# Patient Record
(demographics unavailable — no encounter records)

---

## 2024-10-09 NOTE — PHYSICAL EXAM
[General Appearance - Well Developed] : well developed [General Appearance - Well Nourished] : well nourished [Normal Appearance] : normal appearance [Well Groomed] : well groomed [General Appearance - In No Acute Distress] : no acute distress [Edema] : no peripheral edema [Exaggerated Use Of Accessory Muscles For Inspiration] : no accessory muscle use [Respiration, Rhythm And Depth] : normal respiratory rhythm and effort [Abdomen Soft] : soft [Abdomen Tenderness] : non-tender [Costovertebral Angle Tenderness] : no ~M costovertebral angle tenderness [Urethral Meatus] : normal urethra [Urinary Bladder Findings] : the bladder was normal on palpation [External Female Genitalia] : normal external genitalia [Normal Station and Gait] : the gait and station were normal for the patient's age [Skin Color & Pigmentation] : normal skin color and pigmentation [Skin Turgor] : supple [] : no rash [No Focal Deficits] : no focal deficits [Oriented To Time, Place, And Person] : oriented to person, place, and time [Affect] : the affect was normal [Mood] : the mood was normal [Not Anxious] : not anxious [Chaperone Present] : A chaperone was present in the examining room during all aspects of the physical examination [43769] : A chaperone was present during the pelvic exam. [FreeTextEntry2] : JEFF Angelo MA

## 2024-10-09 NOTE — ASSESSMENT
[FreeTextEntry1] : 74F with longstanding bilateral hydronephrosis, recently noted worsening of left side. Patient has a 1.3 cm stone noted on the right side. Patient is completely asymptomatic. Normal SCr. Patient also with significant microscopic hematuria and episode of gross hematuria in the past.  Negative hematuria workup completed 10/9/2024.  Patient was noted to have significant cystocele on exam with medialization of her UOs.  Renal scan shows no interval change in the last 10 years though persistent obstruction noted.  -Ultrasound and MRI images reviewed from present to 2017 -CT urogram reviewed -patient understands she needs to follow-up with her PCP regarding the lesion in the lung that was noted -Renal lasex scan reviewed  -UA/Ucx reviewed -Office cystoscopy neg for malignancy 10/9/24 - signifcant prolapse noted w/ medialization of UOs -Referral to Dr. Jurado for UDS and pessary versus prolapse repair   I had an extensive discussion with the patient regarding hematuria. We discussed the 2020 AUA Microscopic Hematuria Guidelines, which stratify patients into low-, intermediate-, and high-risk based on factors such as # of RBCs on microscopic analysis, age, gender, smoking history, and additional urothelial cancer risk factors. For low-risk patients, repeat UA in 6 months vs renal US + cysto are recommended. For intermediate-risk patients, renal US + cysto are recommended. For high-risk patients, CT Urogram + cysto are recommended. We discussed evaluation for non-malignant or gynecologic sources of the microscopic hematuria which includes, but is not limited to, kidney/bladder stones, UTIs, vaginal atrophy, and medical renal disease. Per guidelines, patients w/ microscopic hematuria who are on antiplatelet agents or anticoagulant medication should still be evaluated in the same manner as those who are not on such medications. Risk and benefits of evaluation were discussed. All of the patient's questions and concerns were addressed. Based on the 2020 AUA guidelines, this patient falls under HIGH risk.   Rose Kwok MD Chief of Urology, [address], [facility] Entr[address] [place] [address] Phone: [phone] Fax: [phone]

## 2024-10-09 NOTE — ASSESSMENT
[FreeTextEntry1] : 74F with longstanding bilateral hydronephrosis, recently noted worsening of left side. Patient has a 1.3 cm stone noted on the right side. Patient is completely asymptomatic. Normal SCr. Patient also with significant microscopic hematuria and episode of gross hematuria in the past.  Negative hematuria workup completed 10/9/2024.  Patient was noted to have significant cystocele on exam with medialization of her UOs.  Renal scan shows no interval change in the last 10 years though persistent obstruction noted.  -Ultrasound and MRI images reviewed from present to 2017 -CT urogram reviewed -patient understands she needs to follow-up with her PCP regarding the lesion in the lung that was noted -Renal lasex scan reviewed  -UA/Ucx reviewed -Office cystoscopy neg for malignancy 10/9/24 - signifcant prolapse noted w/ medialization of UOs -Referral to Dr. Jurado for UDS and pessary versus prolapse repair   I had an extensive discussion with the patient regarding hematuria. We discussed the 2020 AUA Microscopic Hematuria Guidelines, which stratify patients into low-, intermediate-, and high-risk based on factors such as # of RBCs on microscopic analysis, age, gender, smoking history, and additional urothelial cancer risk factors. For low-risk patients, repeat UA in 6 months vs renal US + cysto are recommended. For intermediate-risk patients, renal US + cysto are recommended. For high-risk patients, CT Urogram + cysto are recommended. We discussed evaluation for non-malignant or gynecologic sources of the microscopic hematuria which includes, but is not limited to, kidney/bladder stones, UTIs, vaginal atrophy, and medical renal disease. Per guidelines, patients w/ microscopic hematuria who are on antiplatelet agents or anticoagulant medication should still be evaluated in the same manner as those who are not on such medications. Risk and benefits of evaluation were discussed. All of the patient's questions and concerns were addressed. Based on the 2020 AUA guidelines, this patient falls under HIGH risk.   Rose Kwok MD Chief of Urology, [address], [facility] [address]tr[address] [place] [address] Phone: [phone] Fax: [phone]

## 2024-10-09 NOTE — PHYSICAL EXAM
[General Appearance - Well Developed] : well developed [General Appearance - Well Nourished] : well nourished [Normal Appearance] : normal appearance [Well Groomed] : well groomed [General Appearance - In No Acute Distress] : no acute distress [Edema] : no peripheral edema [Exaggerated Use Of Accessory Muscles For Inspiration] : no accessory muscle use [Respiration, Rhythm And Depth] : normal respiratory rhythm and effort [Abdomen Soft] : soft [Abdomen Tenderness] : non-tender [Costovertebral Angle Tenderness] : no ~M costovertebral angle tenderness [Urethral Meatus] : normal urethra [Urinary Bladder Findings] : the bladder was normal on palpation [External Female Genitalia] : normal external genitalia [Normal Station and Gait] : the gait and station were normal for the patient's age [Skin Color & Pigmentation] : normal skin color and pigmentation [Skin Turgor] : supple [] : no rash [No Focal Deficits] : no focal deficits [Oriented To Time, Place, And Person] : oriented to person, place, and time [Mood] : the mood was normal [Affect] : the affect was normal [Not Anxious] : not anxious [Chaperone Present] : A chaperone was present in the examining room during all aspects of the physical examination [67263] : A chaperone was present during the pelvic exam. [FreeTextEntry2] : JEFF Angelo MA

## 2024-10-09 NOTE — HISTORY OF PRESENT ILLNESS
[FreeTextEntry1] : Referring Provider: Dr. Park Esquivel Chief Complaint: Bilateral hydronephrosis and nephrolithiasis Date of first visit: 09/09/2024  DINAH SAHU is a 74 year old French woman with a PMHx of HTN, HLD, DM2 who presents for evaluation of bilateral hydronephrosis. Patient states that she is only done this fairly recently, but review of her imaging in PACS show that she has had bilateral hydronephrosis since as early as 2017. At that point in time was greater on the right side than the left, though most recent imaging showed that it is no greater on the left than right. Patient has been completely asymptomatic. Has no history of passing a stone though 1.3 cm stone has been seen on the right side this increase in size since previously noted. Patient reports an episode of gross hematuria in 2012 that she believes might have been associated with an infection but is unclear. She also has been seen to have significant amounts of microscopic hematuria but denies any formal hematuria workup. Patient is completely asymptomatic and denies any history of flank pain. Denies recurrent UTIs.  Negative hematuria workup as of 10/9/2024.  On examination, patient noted to have cystocele that is causing medialization of ureteral orifice.  This may be the cause of her bilateral hydronephrosis due to ureteral kinking.  Renal scan reviewed.  Evidence of obstruction though stable.  Renal Lasix Scan (10/02/24):  -Following diuretic challenge, the T-1/2 washout from the left collecting system is 32 minutes; the T1/2 washout from the right collecting system is 16 minutes (normal:10 minutes or less). -Differential renal function: Right kidney: 48% (previously: 46%) ; Left kidney: 52% (previously: 54%). -Normal flow to and function of the LEFT kidney with evidence of obstruction. Allowing for differences in technique there has been no significant interval change in function or in response to diuretic challenge compared to the previous study of 12/28/2011. -Normal flow to and function of the RIGHT kidney which demonstrates an equivocal response to diuretic challenge. Partial or low-grade obstruction cannot be excluded. Allowing for differences in technique there has been no significant interval change in function or in response to diuretic challenge compared to the previous study of 12/28/2011.  US Hx: - renal/bladder (8/16/2024): Right kidney: 12.2 cm. A 1.3 cm obstructing calculus is seen in the renal pelvis producing moderate right hydronephrosis. This is more pronounced when compared to prior study. Left kidney: 11.6 cm. Severe left hydronephrosis is appreciated. No discrete renal calculus is seen. Distal obstruction cannot be excluded. Urinary bladder: The bladder is moderately distended. No discrete bladder calculus is seen. No significant focal bladder wall thickening is seen. A prevoid bladder volume of 234 cc is noted. A post void bladder volume of 80 cc is noted.  - renal/fsfhkmq97/22/20): Right kidney: 11.3 cm. A 1.1 cm calculus is seen in the right renal pelvis. Mild right hydronephrosis is seen. This is without significant change from prior study. Left kidney: 11.3 cm. Mild to moderate left hydronephrosis is seen. Distal obstruction cannot be excluded. This is also without significant change from prior study. Urinary bladder: The bladder is mildly distended. No focal bladder wall thickening is seen. No discrete bladder calculus is noted. A prevoid bladder volume of 158 cc is noted. A post void bladder volume of 66 cc is noted.  -Renal/bladder (12/18/17): Right kidney: 10.5 cm. Mild to moderate right hydronephrosis is seen. 6 mm calculus is seen in the right renal pelvis. Left kidney: 10.4 cm. Moderate left hydronephrosis is seen. No discrete renal calculus is seen. Distal obstruction cannot be excluded. Urinary bladder: The bladder was empty limiting evaluation.  MRI Hx: -abd (3/17/2021): Stable bilateral hydronephrosis, mild to moderate on the right and moderate on the left. There is a stable small nonobstructive calculus in the dependent right renal pelvis.  CT Hx: - a/p oral contrast (10/12/17): There is bilateral hydronephrosis, moderate on the right and moderate to severe on the left. The degree of hydronephrosis appears increased from prior evaluation and the configuration of hydronephrosis suggests underlying UPJ obstructions. Correlate clinically. There is a 3 mm calculus identified within the right renal pelvis. No left-sided renal calculi are noted. However, there is asymmetric infiltration of the left perirenal fat and thickening of adjacent intraperitoneal fascial planes. 2 small calcifications in each measuring less than 3 mm are identified layering posteriorly within the distended urinary bladder, likely related to passed calculi; left-sided pyelonephritis cannot be excluded.  PMHx: HTN, HLD, GERD, DM 2 SxHx: Hysterectomy (2003), breast surgery FHx: Ovarian cancer (mother), CAD (brother) SocHx: Current smoker (half pack per day x 50 years) Allergies: NKDA, Lipitor gave her myalgias  The patient denies fevers, chills, nausea and or vomiting and no unexplained weight loss. All pertinent laboratory, films and physician notes were reviewed.

## 2024-10-09 NOTE — HISTORY OF PRESENT ILLNESS
[FreeTextEntry1] : Referring Provider: Dr. Park Esquivel Chief Complaint: Bilateral hydronephrosis and nephrolithiasis Date of first visit: 09/09/2024  DINAH SAHU is a 74 year old English woman with a PMHx of HTN, HLD, DM2 who presents for evaluation of bilateral hydronephrosis. Patient states that she is only done this fairly recently, but review of her imaging in PACS show that she has had bilateral hydronephrosis since as early as 2017. At that point in time was greater on the right side than the left, though most recent imaging showed that it is no greater on the left than right. Patient has been completely asymptomatic. Has no history of passing a stone though 1.3 cm stone has been seen on the right side this increase in size since previously noted. Patient reports an episode of gross hematuria in 2012 that she believes might have been associated with an infection but is unclear. She also has been seen to have significant amounts of microscopic hematuria but denies any formal hematuria workup. Patient is completely asymptomatic and denies any history of flank pain. Denies recurrent UTIs.  Negative hematuria workup as of 10/9/2024.  On examination, patient noted to have cystocele that is causing medialization of ureteral orifice.  This may be the cause of her bilateral hydronephrosis due to ureteral kinking.  Renal scan reviewed.  Evidence of obstruction though stable.  Renal Lasix Scan (10/02/24):  -Following diuretic challenge, the T-1/2 washout from the left collecting system is 32 minutes; the T1/2 washout from the right collecting system is 16 minutes (normal:10 minutes or less). -Differential renal function: Right kidney: 48% (previously: 46%) ; Left kidney: 52% (previously: 54%). -Normal flow to and function of the LEFT kidney with evidence of obstruction. Allowing for differences in technique there has been no significant interval change in function or in response to diuretic challenge compared to the previous study of 12/28/2011. -Normal flow to and function of the RIGHT kidney which demonstrates an equivocal response to diuretic challenge. Partial or low-grade obstruction cannot be excluded. Allowing for differences in technique there has been no significant interval change in function or in response to diuretic challenge compared to the previous study of 12/28/2011.  US Hx: - renal/bladder (8/16/2024): Right kidney: 12.2 cm. A 1.3 cm obstructing calculus is seen in the renal pelvis producing moderate right hydronephrosis. This is more pronounced when compared to prior study. Left kidney: 11.6 cm. Severe left hydronephrosis is appreciated. No discrete renal calculus is seen. Distal obstruction cannot be excluded. Urinary bladder: The bladder is moderately distended. No discrete bladder calculus is seen. No significant focal bladder wall thickening is seen. A prevoid bladder volume of 234 cc is noted. A post void bladder volume of 80 cc is noted.  - renal/nsxzwsf47/22/20): Right kidney: 11.3 cm. A 1.1 cm calculus is seen in the right renal pelvis. Mild right hydronephrosis is seen. This is without significant change from prior study. Left kidney: 11.3 cm. Mild to moderate left hydronephrosis is seen. Distal obstruction cannot be excluded. This is also without significant change from prior study. Urinary bladder: The bladder is mildly distended. No focal bladder wall thickening is seen. No discrete bladder calculus is noted. A prevoid bladder volume of 158 cc is noted. A post void bladder volume of 66 cc is noted.  -Renal/bladder (12/18/17): Right kidney: 10.5 cm. Mild to moderate right hydronephrosis is seen. 6 mm calculus is seen in the right renal pelvis. Left kidney: 10.4 cm. Moderate left hydronephrosis is seen. No discrete renal calculus is seen. Distal obstruction cannot be excluded. Urinary bladder: The bladder was empty limiting evaluation.  MRI Hx: -abd (3/17/2021): Stable bilateral hydronephrosis, mild to moderate on the right and moderate on the left. There is a stable small nonobstructive calculus in the dependent right renal pelvis.  CT Hx: - a/p oral contrast (10/12/17): There is bilateral hydronephrosis, moderate on the right and moderate to severe on the left. The degree of hydronephrosis appears increased from prior evaluation and the configuration of hydronephrosis suggests underlying UPJ obstructions. Correlate clinically. There is a 3 mm calculus identified within the right renal pelvis. No left-sided renal calculi are noted. However, there is asymmetric infiltration of the left perirenal fat and thickening of adjacent intraperitoneal fascial planes. 2 small calcifications in each measuring less than 3 mm are identified layering posteriorly within the distended urinary bladder, likely related to passed calculi; left-sided pyelonephritis cannot be excluded.  PMHx: HTN, HLD, GERD, DM 2 SxHx: Hysterectomy (2003), breast surgery FHx: Ovarian cancer (mother), CAD (brother) SocHx: Current smoker (half pack per day x 50 years) Allergies: NKDA, Lipitor gave her myalgias  The patient denies fevers, chills, nausea and or vomiting and no unexplained weight loss. All pertinent laboratory, films and physician notes were reviewed.

## 2024-10-23 NOTE — PHYSICAL EXAM
[No JVD] : no jugular venous distention [Normal] : normal rate, regular rhythm, normal S1 and S2 and no murmur heard [Soft] : abdomen soft [Non Tender] : non-tender [Non-distended] : non-distended [No Joint Swelling] : no joint swelling [Grossly Normal Strength/Tone] : grossly normal strength/tone [de-identified] : pain LS Spine L4-5 area with pain piriformis muscle area 4/5 from RLE 5/5 left good ROM when stretching

## 2024-10-23 NOTE — HEALTH RISK ASSESSMENT
[Intercurrent ED visits] : went to ED [No falls in past year] : Patient reported no falls in the past year [de-identified] : Hematuria back pain hydropnephrosis

## 2024-10-23 NOTE — ASSESSMENT
[FreeTextEntry1] : d/w pt ROM stretching exercises  sent Mobic prn advised warm compresses and sent PT and Xray f/u 6 weeks   I am providing continuous care that includes testing, discussion of treatment options and shared decision making on diagnosis chosen treatment.

## 2024-10-23 NOTE — HISTORY OF PRESENT ILLNESS
[FreeTextEntry1] : ED visit f/u Right hydronephrosis heme [de-identified] : 74year-old-female with history of T2DM, HTN, HLD and hx of kidney stone ended up in ED with c/o 2-day history of hematuria and right lower abdominal pain and right flank pain, with associated nausea. Denies fever or vomiting at home.  Workup includes labs, UA/UC and CTAP. IV hydration and pain control pt had already been seen urology and was advised to follow up with Dr. Kwok Pt now here notes is having a lot back pain when she moves and also low back pain states for one year was good, and states was vacuuming stairs that caused it to get worse. Pt denies bowel or bladder issues and notes is still smoking.  Pt note saw Dr Sears and for one year did not get pain

## 2024-10-23 NOTE — REVIEW OF SYSTEMS
[Fatigue] : fatigue [Muscle Pain] : muscle pain [Back Pain] : back pain [FreeTextEntry9] : LS back pain worse right side radiating to RLE

## 2024-10-28 NOTE — HISTORY OF PRESENT ILLNESS
[Currently Experiencing ___] :  [unfilled] [Hematuria - Gross] : gross hematuria [6] : 6 [Left Flank] : left flank [FreeTextEntry1] : Referring Provider: Dr. Park Esquivel Chief Complaint: Bilateral hydronephrosis and nephrolithiasis Date of first visit: 09/09/2024  DINAH SAHU is a 74 year old [place] woman with a PMHx of HTN, HLD, DM2 who presents for evaluation of bilateral hydronephrosis. Patient states that she is only done this fairly recently, but review of her imaging in PACS show that she has had bilateral hydronephrosis since as early as 2017. At that point in time was greater on the right side than the left, though most recent imaging showed that it is no greater on the left than right. Patient has been completely asymptomatic. Has no history of passing a stone though 1.3 cm stone has been seen on the right side this increase in size since previously noted. Patient reports an episode of gross hematuria in 2012 that she believes might have been associated with an infection but is unclear. She also has been seen to have significant amounts of microscopic hematuria but denies any formal hematuria workup. Patient is completely asymptomatic and denies any history of flank pain. Denies recurrent UTIs.  Negative hematuria workup as of 10/9/2024. On examination, patient noted to have cystocele that is causing medialization of ureteral orifice. This may be the cause of her bilateral hydronephrosis due to ureteral kinking. Renal scan reviewed. Evidence of obstruction though stable.  As of 10/28/24, patient presents today s/p ER evaluation on 10/14/24 for right renal colic. CT showed right hemorrhage within collecting system, possible due to intrarenal stone. Hx of bilateral hydronephrosis and cystocele, underwent in office cysto on 10/9/24. Discharged home with close follow up. UA/Ucx ordered on 10/24/24 after patient called office for complaints of UTI symptoms with no reported gross hematuria. +Ucx positive, abx sent. Today, she presents to the office pale and ill-appearing with reports of gross hematuria, currently describing urine as a burgundy color that began 10/26/24, accompanied by dizziness, weakness and left flank pain radiating to LLQ. She reports one episode of vomiting on Saturday. Denies fever, although has not checked her temperature. She denies chest pain or sob.   Ucx Hx 10/25/24: Prelim <100,000 Gram Negative Rods   Renal Lasix Scan (10/02/24): -Following diuretic challenge, the T-1/2 washout from the left collecting system is 32 minutes; the T1/2 washout from the right collecting system is 16 minutes (normal:10 minutes or less). -Differential renal function: Right kidney: 48% (previously: 46%) ; Left kidney: 52% (previously: 54%). -Normal flow to and function of the LEFT kidney with evidence of obstruction. Allowing for differences in technique there has been no significant interval change in function or in response to diuretic challenge compared to the previous study of 12/28/2011. -Normal flow to and function of the RIGHT kidney which demonstrates an equivocal response to diuretic challenge. Partial or low-grade obstruction cannot be excluded. Allowing for differences in technique there has been no significant interval change in function or in response to diuretic challenge compared to the previous study of 12/28/2011.  US Hx: - renal/bladder (8/16/2024): Right kidney: 12.2 cm. A 1.3 cm obstructing calculus is seen in the renal pelvis producing moderate right hydronephrosis. This is more pronounced when compared to prior study. Left kidney: 11.6 cm. Severe left hydronephrosis is appreciated. No discrete renal calculus is seen. Distal obstruction cannot be excluded. Urinary bladder: The bladder is moderately distended. No discrete bladder calculus is seen. No significant focal bladder wall thickening is seen. A prevoid bladder volume of 234 cc is noted. A post void bladder volume of 80 cc is noted.  - renal/ldcylcy06/22/20): Right kidney: 11.3 cm. A 1.1 cm calculus is seen in the right renal pelvis. Mild right hydronephrosis is seen. This is without significant change from prior study. Left kidney: 11.3 cm. Mild to moderate left hydronephrosis is seen. Distal obstruction cannot be excluded. This is also without significant change from prior study. Urinary bladder: The bladder is mildly distended. No focal bladder wall thickening is seen. No discrete bladder calculus is noted. A prevoid bladder volume of 158 cc is noted. A post void bladder volume of 66 cc is noted.  -Renal/bladder (12/18/17): Right kidney: 10.5 cm. Mild to moderate right hydronephrosis is seen. 6 mm calculus is seen in the right renal pelvis. Left kidney: 10.4 cm. Moderate left hydronephrosis is seen. No discrete renal calculus is seen. Distal obstruction cannot be excluded. Urinary bladder: The bladder was empty limiting evaluation.  MRI Hx: -abd (3/17/2021): Stable bilateral hydronephrosis, mild to moderate on the right and moderate on the left. There is a stable small nonobstructive calculus in the dependent right renal pelvis.  CT Hx: -a/p contrast (10/14/24): KIDNEYS/URETERS: Moderately severe right and moderate left-sided dilatation of the renal pelvis and fullness of the calyces, essentially unchanged. There has been interval development of hyperdense material within the right renal infundibula and renal pelvis suggestive of hemorrhagic content. There is an 8 mm sized nonobstructing calculus within the dilated right renal pelvis and a 3 mm sized nonobstructing calculus within the right lower pole renal calyx. Mural enhancement of the right renal pelvis and proximal right ureter is noted suggesting pyelitis and ureteritis. BLADDER: Under distended. Although mural thickening may be related to underdistention, there is perivesicular haziness concerning for cystitis. REPRODUCTIVE ORGANS: Hysterectomy. BOWEL: No bowel obstruction. Appendix is normal. Uncomplicated sigmoid diverticula. Rectocele. PERITONEUM/RETROPERITONEUM: Right paracolic haziness and stranding. Cannot exclude an infiltrative process involving the peritoneum. - a/p oral contrast (10/12/17): There is bilateral hydronephrosis, moderate on the right and moderate to severe on the left. The degree of hydronephrosis appears increased from prior evaluation and the configuration of hydronephrosis suggests underlying UPJ obstructions. Correlate clinically. There is a 3 mm calculus identified within the right renal pelvis. No left-sided renal calculi are noted. However, there is asymmetric infiltration of the left perirenal fat and thickening of adjacent intraperitoneal fascial planes. 2 small calcifications in each measuring less than 3 mm are identified layering posteriorly within the distended urinary bladder, likely related to passed calculi; left-sided pyelonephritis cannot be excluded.  PMHx: HTN, HLD, GERD, DM 2 SxHx: Hysterectomy (2003), breast surgery FHx: Ovarian cancer (mother), CAD (brother) SocHx: Current smoker (half pack per day x 50 years) Allergies: NKDA, Lipitor gave her myalgias  The patient denies fevers, chills, nausea and or vomiting and no unexplained weight loss. All pertinent laboratory, films and physician notes were reviewed.

## 2024-10-28 NOTE — PHYSICAL EXAM
[Normal Appearance] : normal appearance [Well Groomed] : well groomed [General Appearance - In No Acute Distress] : no acute distress [Edema] : no peripheral edema [] : no respiratory distress [Respiration, Rhythm And Depth] : normal respiratory rhythm and effort [Exaggerated Use Of Accessory Muscles For Inspiration] : no accessory muscle use [Abdomen Soft] : soft [Urinary Bladder Findings] : the bladder was normal on palpation [Normal Station and Gait] : the gait and station were normal for the patient's age [No Focal Deficits] : no focal deficits [Oriented To Time, Place, And Person] : oriented to person, place, and time [Affect] : the affect was normal [Mood] : the mood was normal [de-identified] : +Left CVA tenderness, +LLQ on palpation [de-identified] : general apperance pale, pale conjunctiva noted

## 2024-10-28 NOTE — HISTORY OF PRESENT ILLNESS
[Currently Experiencing ___] :  [unfilled] [Hematuria - Gross] : gross hematuria [6] : 6 [Left Flank] : left flank [FreeTextEntry1] : Referring Provider: Dr. Park Esquivel Chief Complaint: Bilateral hydronephrosis and nephrolithiasis Date of first visit: 09/09/2024  DINAH SAHU is a 74 year old [place] woman with a PMHx of HTN, HLD, DM2 who presents for evaluation of bilateral hydronephrosis. Patient states that she is only done this fairly recently, but review of her imaging in PACS show that she has had bilateral hydronephrosis since as early as 2017. At that point in time was greater on the right side than the left, though most recent imaging showed that it is no greater on the left than right. Patient has been completely asymptomatic. Has no history of passing a stone though 1.3 cm stone has been seen on the right side this increase in size since previously noted. Patient reports an episode of gross hematuria in 2012 that she believes might have been associated with an infection but is unclear. She also has been seen to have significant amounts of microscopic hematuria but denies any formal hematuria workup. Patient is completely asymptomatic and denies any history of flank pain. Denies recurrent UTIs.  Negative hematuria workup as of 10/9/2024. On examination, patient noted to have cystocele that is causing medialization of ureteral orifice. This may be the cause of her bilateral hydronephrosis due to ureteral kinking. Renal scan reviewed. Evidence of obstruction though stable.  As of 10/28/24, patient presents today s/p ER evaluation on 10/14/24 for right renal colic. CT showed right hemorrhage within collecting system, possible due to intrarenal stone. Hx of bilateral hydronephrosis and cystocele, underwent in office cysto on 10/9/24. Discharged home with close follow up. UA/Ucx ordered on 10/24/24 after patient called office for complaints of UTI symptoms with no reported gross hematuria. +Ucx positive, abx sent. Today, she presents to the office pale and ill-appearing with reports of gross hematuria, currently describing urine as a burgundy color that began 10/26/24, accompanied by dizziness, weakness and left flank pain radiating to LLQ. She reports one episode of vomiting on Saturday. Denies fever, although has not checked her temperature. She denies chest pain or sob.   Ucx Hx 10/25/24: Prelim <100,000 Gram Negative Rods   Renal Lasix Scan (10/02/24): -Following diuretic challenge, the T-1/2 washout from the left collecting system is 32 minutes; the T1/2 washout from the right collecting system is 16 minutes (normal:10 minutes or less). -Differential renal function: Right kidney: 48% (previously: 46%) ; Left kidney: 52% (previously: 54%). -Normal flow to and function of the LEFT kidney with evidence of obstruction. Allowing for differences in technique there has been no significant interval change in function or in response to diuretic challenge compared to the previous study of 12/28/2011. -Normal flow to and function of the RIGHT kidney which demonstrates an equivocal response to diuretic challenge. Partial or low-grade obstruction cannot be excluded. Allowing for differences in technique there has been no significant interval change in function or in response to diuretic challenge compared to the previous study of 12/28/2011.  US Hx: - renal/bladder (8/16/2024): Right kidney: 12.2 cm. A 1.3 cm obstructing calculus is seen in the renal pelvis producing moderate right hydronephrosis. This is more pronounced when compared to prior study. Left kidney: 11.6 cm. Severe left hydronephrosis is appreciated. No discrete renal calculus is seen. Distal obstruction cannot be excluded. Urinary bladder: The bladder is moderately distended. No discrete bladder calculus is seen. No significant focal bladder wall thickening is seen. A prevoid bladder volume of 234 cc is noted. A post void bladder volume of 80 cc is noted.  - renal/nommsix19/22/20): Right kidney: 11.3 cm. A 1.1 cm calculus is seen in the right renal pelvis. Mild right hydronephrosis is seen. This is without significant change from prior study. Left kidney: 11.3 cm. Mild to moderate left hydronephrosis is seen. Distal obstruction cannot be excluded. This is also without significant change from prior study. Urinary bladder: The bladder is mildly distended. No focal bladder wall thickening is seen. No discrete bladder calculus is noted. A prevoid bladder volume of 158 cc is noted. A post void bladder volume of 66 cc is noted.  -Renal/bladder (12/18/17): Right kidney: 10.5 cm. Mild to moderate right hydronephrosis is seen. 6 mm calculus is seen in the right renal pelvis. Left kidney: 10.4 cm. Moderate left hydronephrosis is seen. No discrete renal calculus is seen. Distal obstruction cannot be excluded. Urinary bladder: The bladder was empty limiting evaluation.  MRI Hx: -abd (3/17/2021): Stable bilateral hydronephrosis, mild to moderate on the right and moderate on the left. There is a stable small nonobstructive calculus in the dependent right renal pelvis.  CT Hx: -a/p contrast (10/14/24): KIDNEYS/URETERS: Moderately severe right and moderate left-sided dilatation of the renal pelvis and fullness of the calyces, essentially unchanged. There has been interval development of hyperdense material within the right renal infundibula and renal pelvis suggestive of hemorrhagic content. There is an 8 mm sized nonobstructing calculus within the dilated right renal pelvis and a 3 mm sized nonobstructing calculus within the right lower pole renal calyx. Mural enhancement of the right renal pelvis and proximal right ureter is noted suggesting pyelitis and ureteritis. BLADDER: Under distended. Although mural thickening may be related to underdistention, there is perivesicular haziness concerning for cystitis. REPRODUCTIVE ORGANS: Hysterectomy. BOWEL: No bowel obstruction. Appendix is normal. Uncomplicated sigmoid diverticula. Rectocele. PERITONEUM/RETROPERITONEUM: Right paracolic haziness and stranding. Cannot exclude an infiltrative process involving the peritoneum. - a/p oral contrast (10/12/17): There is bilateral hydronephrosis, moderate on the right and moderate to severe on the left. The degree of hydronephrosis appears increased from prior evaluation and the configuration of hydronephrosis suggests underlying UPJ obstructions. Correlate clinically. There is a 3 mm calculus identified within the right renal pelvis. No left-sided renal calculi are noted. However, there is asymmetric infiltration of the left perirenal fat and thickening of adjacent intraperitoneal fascial planes. 2 small calcifications in each measuring less than 3 mm are identified layering posteriorly within the distended urinary bladder, likely related to passed calculi; left-sided pyelonephritis cannot be excluded.  PMHx: HTN, HLD, GERD, DM 2 SxHx: Hysterectomy (2003), breast surgery FHx: Ovarian cancer (mother), CAD (brother) SocHx: Current smoker (half pack per day x 50 years) Allergies: NKDA, Lipitor gave her myalgias  The patient denies fevers, chills, nausea and or vomiting and no unexplained weight loss. All pertinent laboratory, films and physician notes were reviewed.

## 2024-10-28 NOTE — PHYSICAL EXAM
[Normal Appearance] : normal appearance [Well Groomed] : well groomed [General Appearance - In No Acute Distress] : no acute distress [Edema] : no peripheral edema [] : no respiratory distress [Respiration, Rhythm And Depth] : normal respiratory rhythm and effort [Exaggerated Use Of Accessory Muscles For Inspiration] : no accessory muscle use [Abdomen Soft] : soft [Urinary Bladder Findings] : the bladder was normal on palpation [Normal Station and Gait] : the gait and station were normal for the patient's age [No Focal Deficits] : no focal deficits [Oriented To Time, Place, And Person] : oriented to person, place, and time [Affect] : the affect was normal [Mood] : the mood was normal [de-identified] : +Left CVA tenderness, +LLQ on palpation [de-identified] : general apperance pale, pale conjunctiva noted

## 2024-10-28 NOTE — ASSESSMENT
[FreeTextEntry1] : 74F with longstanding bilateral hydronephrosis, recently noted worsening of left side. Patient has a 1.3 cm stone noted on the right side. Patient was noted to have significant cystocele on exam with medialization of her UOs. Renal scan shows no interval change in the last 10 years though persistent obstruction noted. Negative hematuria workup completed 10/9/2024. +Ucx from10/25/24, treated with Keflex.  Now with complaints of gross hematuria since Saturday. Hemodynamically stable, but with soft BP. Patient ill-appearing and pale on presentation. +Left CVA tenderness elicited on PE. Afebrile. ER recommendation was strongly encouraged. Offered patient direct transportation to ER, however patient insisting on going home first to feed her cats, but states she will present to the ER today thereafter. Denies dizziness/weakness at this time. Recommended patient call ambulance if she is dizzy or weak.   -Hospital labs and imaging reviewed -Ultrasound and MRI images reviewed from present to 2017 -CT urogram reviewed -patient understands she needs to follow-up with her PCP regarding the lesion in the lung that was noted -Renal lasix scan reviewed -UA/Ucx reviewed - on Keflex -Office cystoscopy neg for malignancy 10/9/24 - signifcant prolapse noted w/ medialization of UOs -Referral to Dr. Jurado for UDS and pessary versus prolapse repair   Rose Kwok MD Chief of Urology, [address], [address] [place] [address] Phone: [phone] Fax: [phone].

## 2024-10-28 NOTE — ASSESSMENT
[FreeTextEntry1] : 74F with longstanding bilateral hydronephrosis, recently noted worsening of left side. Patient has a 1.3 cm stone noted on the right side. Patient was noted to have significant cystocele on exam with medialization of her UOs. Renal scan shows no interval change in the last 10 years though persistent obstruction noted. Negative hematuria workup completed 10/9/2024. +Ucx from10/25/24, treated with Keflex.  Now with complaints of gross hematuria since Saturday. Hemodynamically stable, but with soft BP. Patient ill-appearing and pale on presentation. +Left CVA tenderness elicited on PE. Afebrile. ER recommendation was strongly encouraged. Offered patient direct transportation to ER, however patient insisting on going home first to feed her cats, but states she will present to the ER today thereafter. Denies dizziness/weakness at this time. Recommended patient call ambulance if she is dizzy or weak.   -Hospital labs and imaging reviewed -Ultrasound and MRI images reviewed from present to 2017 -CT urogram reviewed -patient understands she needs to follow-up with her PCP regarding the lesion in the lung that was noted -Renal lasix scan reviewed -UA/Ucx reviewed - on Keflex -Office cystoscopy neg for malignancy 10/9/24 - signifcant prolapse noted w/ medialization of UOs -Referral to Dr. Jurado for UDS and pessary versus prolapse repair   Rose Kwok MD Chief of Urology, [address], [address] [place] [address] Phone: [phone] Fax: [phone].

## 2024-12-30 NOTE — PHYSICAL EXAM
[Normal Appearance] : normal appearance [Well Groomed] : well groomed [General Appearance - In No Acute Distress] : no acute distress [Edema] : no peripheral edema [Respiration, Rhythm And Depth] : normal respiratory rhythm and effort [Exaggerated Use Of Accessory Muscles For Inspiration] : no accessory muscle use [Abdomen Soft] : soft [Abdomen Tenderness] : non-tender [Costovertebral Angle Tenderness] : no ~M costovertebral angle tenderness [Urinary Bladder Findings] : the bladder was normal on palpation [Normal Station and Gait] : the gait and station were normal for the patient's age [] : no rash [No Focal Deficits] : no focal deficits [Oriented To Time, Place, And Person] : oriented to person, place, and time [Affect] : the affect was normal [Mood] : the mood was normal [No Palpable Adenopathy] : no palpable adenopathy [Chaperone Present] : A chaperone was present in the examining room during all aspects of the physical examination [76625] : A chaperone was present during the pelvic exam. 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  Tender to exam requiring lidocaine gel for a thorough exam [FreeTextEntry2] :  Ora Boone M.A

## 2024-12-30 NOTE — ASSESSMENT
[FreeTextEntry1] : 74-year-old with bilateral hydronephrosis likely UPJ obstruction in nature.  Vaginal wall prolapse.  Small bladder capacity unsure if it is from bilateral renal stents versus OAB  Discussed treatment for urgency and frequency at this time as well as pessary placement. Agrees to [facility] for  OAB sx.   will obtain renal sono abx sent  to pharmacy - [facility]- notified by pharmacy of interaction with her home enalapril - hyperkalemia- and advised to take meds 4 hours apart and not consume foods high in potassium Will review results and discuss with referring provider. All questions answered

## 2024-12-30 NOTE — HISTORY OF PRESENT ILLNESS
[FreeTextEntry1] : 74-year-old female presents today for urodynamic testing for unexplained bilateral hydronephrosis. Currently there are bilateral stents in place. Films reviewed from 2004 to present.  In 2004 diagnosed with extrarenal pelvis, in 2011 diagnosed with bilateral hydronephrosis and UPJ obstruction.  Renal nuclear scan also consistent with obstruction. Patient with prolapse of vaginal vault status post hysterectomy and oophorectomy for ovarian masses.

## 2025-01-15 NOTE — HISTORY OF PRESENT ILLNESS
[None] : no symptoms [Urinary Urgency] : urinary urgency [Urinary Frequency] : urinary frequency [FreeTextEntry1] : Referring Provider: Dr. Park Esquivel Chief Complaint: Bilateral hydronephrosis and nephrolithiasis Date of first visit: 09/09/2024  DINAH SAHU is a 74 year old French woman with a PMHx of HTN, HLD, DM2 who presents for evaluation of bilateral hydronephrosis. Patient states that she is only done this fairly recently, but review of her imaging in PACS show that she has had bilateral hydronephrosis since as early as 2017. At that point in time was greater on the right side than the left, though most recent imaging showed that it is no greater on the left than right. Patient has been completely asymptomatic. Has no history of passing a stone though 1.3 cm stone has been seen on the right side this increase in size since previously noted. Patient reports an episode of gross hematuria in 2012 that she believes might have been associated with an infection but is unclear. She also has been seen to have significant amounts of microscopic hematuria but denies any formal hematuria workup. Patient is completely asymptomatic and denies any history of flank pain. Denies recurrent UTIs.  Negative hematuria workup as of 10/9/2024. On examination, patient noted to have cystocele that is causing medialization of ureteral orifice. This may be the cause of her bilateral hydronephrosis due to ureteral kinking. Renal scan reviewed. Evidence of obstruction though stable.  As of 10/28/24, patient presents today s/p ER evaluation on 10/14/24 for right renal colic. CT showed right hemorrhage within collecting system, possible due to intrarenal stone. Hx of bilateral hydronephrosis and cystocele, underwent in office cysto on 10/9/24. Discharged home with close follow up. UA/Ucx ordered on 10/24/24 after patient called office for complaints of UTI symptoms with no reported gross hematuria. +Ucx positive, abx sent. Today, she presents to the office pale and ill-appearing with reports of gross hematuria, currently describing urine as a burgundy color that began 10/26/24, accompanied by dizziness, weakness and left flank pain radiating to LLQ. She reports one episode of vomiting on Saturday. Denies fever, although has not checked her temperature. She denies chest pain or sob.  As of 1/13/2025, patient presents today for follow-up.  Patient is status post bilateral ureteral stent placement on 10/30/2024.  History of unexplained bilateral hydronephrosis and UPJ obstruction. She was seen by Dr. Jurado on 12/30/2024 and underwent UDS.  Patient was noted to small bladder capacity, unsure if related to bilateral stents vs OAB.  She was sent home with Gemtesa for OAB symptoms, however patient reports medication is too expensive and has not continued treatment.  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  Patient was also advised to undergo renal ultrasound, pending imaging date. She presents today with complaints of suprapubic pain, as well as urinary urgency and frequency. Reports occasional nausea, which patient endorses is triggered by stent pain.  She denies fever or gross hematuria.  Denies leaking or sensation of incomplete bladder emptying.  Ucx Hx 12/30/2024 Negative 10/25/24: Prelim <100,000 Gram Negative Rods  Renal Lasix Scan (10/02/24): -Following diuretic challenge, the T-1/2 washout from the left collecting system is 32 minutes; the T1/2 washout from the right collecting system is 16 minutes (normal:10 minutes or less). -Differential renal function: Right kidney: 48% (previously: 46%) ; Left kidney: 52% (previously: 54%). -Normal flow to and function of the LEFT kidney with evidence of obstruction. Allowing for differences in technique there has been no significant interval change in function or in response to diuretic challenge compared to the previous study of 12/28/2011. -Normal flow to and function of the RIGHT kidney which demonstrates an equivocal response to diuretic challenge. Partial or low-grade obstruction cannot be excluded. Allowing for differences in technique there has been no significant interval change in function or in response to diuretic challenge compared to the previous study of 12/28/2011.  US Hx: - renal/bladder (8/16/2024): Right kidney: 12.2 cm. A 1.3 cm obstructing calculus is seen in the renal pelvis producing moderate right hydronephrosis. This is more pronounced when compared to prior study. Left kidney: 11.6 cm. Severe left hydronephrosis is appreciated. No discrete renal calculus is seen. Distal obstruction cannot be excluded. Urinary bladder: The bladder is moderately distended. No discrete bladder calculus is seen. No significant focal bladder wall thickening is seen. A prevoid bladder volume of 234 cc is noted. A post void bladder volume of 80 cc is noted.  - renal/vlfmrak76/22/20): Right kidney: 11.3 cm. A 1.1 cm calculus is seen in the right renal pelvis. Mild right hydronephrosis is seen. This is without significant change from prior study. Left kidney: 11.3 cm. Mild to moderate left hydronephrosis is seen. Distal obstruction cannot be excluded. This is also without significant change from prior study. Urinary bladder: The bladder is mildly distended. No focal bladder wall thickening is seen. No discrete bladder calculus is noted. A prevoid bladder volume of 158 cc is noted. A post void bladder volume of 66 cc is noted.  -Renal/bladder (12/18/17): Right kidney: 10.5 cm. Mild to moderate right hydronephrosis is seen. 6 mm calculus is seen in the right renal pelvis. Left kidney: 10.4 cm. Moderate left hydronephrosis is seen. No discrete renal calculus is seen. Distal obstruction cannot be excluded. Urinary bladder: The bladder was empty limiting evaluation.  MRI Hx: -abd (3/17/2021): Stable bilateral hydronephrosis, mild to moderate on the right and moderate on the left. There is a stable small nonobstructive calculus in the dependent right renal pelvis.  CT Hx: -a/p contrast (10/14/24): KIDNEYS/URETERS: Moderately severe right and moderate left-sided dilatation of the renal pelvis and fullness of the calyces, essentially unchanged. There has been interval development of hyperdense material within the right renal infundibula and renal pelvis suggestive of hemorrhagic content. There is an 8 mm sized nonobstructing calculus within the dilated right renal pelvis and a 3 mm sized nonobstructing calculus within the right lower pole renal calyx. Mural enhancement of the right renal pelvis and proximal right ureter is noted suggesting pyelitis and ureteritis. BLADDER: Under distended. Although mural thickening may be related to underdistention, there is perivesicular haziness concerning for cystitis. REPRODUCTIVE ORGANS: Hysterectomy. BOWEL: No bowel obstruction. Appendix is normal. Uncomplicated sigmoid diverticula. Rectocele. PERITONEUM/RETROPERITONEUM: Right paracolic haziness and stranding. Cannot exclude an infiltrative process involving the peritoneum. - a/p oral contrast (10/12/17): There is bilateral hydronephrosis, moderate on the right and moderate to severe on the left. The degree of hydronephrosis appears increased from prior evaluation and the configuration of hydronephrosis suggests underlying UPJ obstructions. Correlate clinically. There is a 3 mm calculus identified within the right renal pelvis. No left-sided renal calculi are noted. However, there is asymmetric infiltration of the left perirenal fat and thickening of adjacent intraperitoneal fascial planes. 2 small calcifications in each measuring less than 3 mm are identified layering posteriorly within the distended urinary bladder, likely related to passed calculi; left-sided pyelonephritis cannot be excluded.  PMHx: HTN, HLD, GERD, DM 2 SxHx: Hysterectomy (2003), breast surgery FHx: Ovarian cancer (mother), CAD (brother) SocHx: Current smoker (half pack per day x 50 years) Allergies: NKDA, Lipitor gave her myalgias  The patient denies fevers, chills, nausea and or vomiting and no unexplained weight loss. All pertinent laboratory, films and physician notes were reviewed.

## 2025-01-15 NOTE — HISTORY OF PRESENT ILLNESS
[None] : no symptoms [Urinary Urgency] : urinary urgency [Urinary Frequency] : urinary frequency [FreeTextEntry1] : Referring Provider: Dr. Park Esquivel Chief Complaint: Bilateral hydronephrosis and nephrolithiasis Date of first visit: 09/09/2024  DINAH SAHU is a 74 year old Urdu woman with a PMHx of HTN, HLD, DM2 who presents for evaluation of bilateral hydronephrosis. Patient states that she is only done this fairly recently, but review of her imaging in PACS show that she has had bilateral hydronephrosis since as early as 2017. At that point in time was greater on the right side than the left, though most recent imaging showed that it is no greater on the left than right. Patient has been completely asymptomatic. Has no history of passing a stone though 1.3 cm stone has been seen on the right side this increase in size since previously noted. Patient reports an episode of gross hematuria in 2012 that she believes might have been associated with an infection but is unclear. She also has been seen to have significant amounts of microscopic hematuria but denies any formal hematuria workup. Patient is completely asymptomatic and denies any history of flank pain. Denies recurrent UTIs.  Negative hematuria workup as of 10/9/2024. On examination, patient noted to have cystocele that is causing medialization of ureteral orifice. This may be the cause of her bilateral hydronephrosis due to ureteral kinking. Renal scan reviewed. Evidence of obstruction though stable.  As of 10/28/24, patient presents today s/p ER evaluation on 10/14/24 for right renal colic. CT showed right hemorrhage within collecting system, possible due to intrarenal stone. Hx of bilateral hydronephrosis and cystocele, underwent in office cysto on 10/9/24. Discharged home with close follow up. UA/Ucx ordered on 10/24/24 after patient called office for complaints of UTI symptoms with no reported gross hematuria. +Ucx positive, abx sent. Today, she presents to the office pale and ill-appearing with reports of gross hematuria, currently describing urine as a burgundy color that began 10/26/24, accompanied by dizziness, weakness and left flank pain radiating to LLQ. She reports one episode of vomiting on Saturday. Denies fever, although has not checked her temperature. She denies chest pain or sob.  As of 1/13/2025, patient presents today for follow-up.  Patient is status post bilateral ureteral stent placement on 10/30/2024.  History of unexplained bilateral hydronephrosis and UPJ obstruction. She was seen by Dr. Jurado on 12/30/2024 and underwent UDS.  Patient was noted to small bladder capacity, unsure if related to bilateral stents vs OAB.  She was sent home with Gemtesa for OAB symptoms, however patient reports medication is too expensive and has not continued treatment.  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  Patient was also advised to undergo renal ultrasound, pending imaging date. She presents today with complaints of suprapubic pain, as well as urinary urgency and frequency. Reports occasional nausea, which patient endorses is triggered by stent pain.  She denies fever or gross hematuria.  Denies leaking or sensation of incomplete bladder emptying.  Ucx Hx 12/30/2024 Negative 10/25/24: Prelim <100,000 Gram Negative Rods  Renal Lasix Scan (10/02/24): -Following diuretic challenge, the T-1/2 washout from the left collecting system is 32 minutes; the T1/2 washout from the right collecting system is 16 minutes (normal:10 minutes or less). -Differential renal function: Right kidney: 48% (previously: 46%) ; Left kidney: 52% (previously: 54%). -Normal flow to and function of the LEFT kidney with evidence of obstruction. Allowing for differences in technique there has been no significant interval change in function or in response to diuretic challenge compared to the previous study of 12/28/2011. -Normal flow to and function of the RIGHT kidney which demonstrates an equivocal response to diuretic challenge. Partial or low-grade obstruction cannot be excluded. Allowing for differences in technique there has been no significant interval change in function or in response to diuretic challenge compared to the previous study of 12/28/2011.  US Hx: - renal/bladder (8/16/2024): Right kidney: 12.2 cm. A 1.3 cm obstructing calculus is seen in the renal pelvis producing moderate right hydronephrosis. This is more pronounced when compared to prior study. Left kidney: 11.6 cm. Severe left hydronephrosis is appreciated. No discrete renal calculus is seen. Distal obstruction cannot be excluded. Urinary bladder: The bladder is moderately distended. No discrete bladder calculus is seen. No significant focal bladder wall thickening is seen. A prevoid bladder volume of 234 cc is noted. A post void bladder volume of 80 cc is noted.  - renal/mmasqkb05/22/20): Right kidney: 11.3 cm. A 1.1 cm calculus is seen in the right renal pelvis. Mild right hydronephrosis is seen. This is without significant change from prior study. Left kidney: 11.3 cm. Mild to moderate left hydronephrosis is seen. Distal obstruction cannot be excluded. This is also without significant change from prior study. Urinary bladder: The bladder is mildly distended. No focal bladder wall thickening is seen. No discrete bladder calculus is noted. A prevoid bladder volume of 158 cc is noted. A post void bladder volume of 66 cc is noted.  -Renal/bladder (12/18/17): Right kidney: 10.5 cm. Mild to moderate right hydronephrosis is seen. 6 mm calculus is seen in the right renal pelvis. Left kidney: 10.4 cm. Moderate left hydronephrosis is seen. No discrete renal calculus is seen. Distal obstruction cannot be excluded. Urinary bladder: The bladder was empty limiting evaluation.  MRI Hx: -abd (3/17/2021): Stable bilateral hydronephrosis, mild to moderate on the right and moderate on the left. There is a stable small nonobstructive calculus in the dependent right renal pelvis.  CT Hx: -a/p contrast (10/14/24): KIDNEYS/URETERS: Moderately severe right and moderate left-sided dilatation of the renal pelvis and fullness of the calyces, essentially unchanged. There has been interval development of hyperdense material within the right renal infundibula and renal pelvis suggestive of hemorrhagic content. There is an 8 mm sized nonobstructing calculus within the dilated right renal pelvis and a 3 mm sized nonobstructing calculus within the right lower pole renal calyx. Mural enhancement of the right renal pelvis and proximal right ureter is noted suggesting pyelitis and ureteritis. BLADDER: Under distended. Although mural thickening may be related to underdistention, there is perivesicular haziness concerning for cystitis. REPRODUCTIVE ORGANS: Hysterectomy. BOWEL: No bowel obstruction. Appendix is normal. Uncomplicated sigmoid diverticula. Rectocele. PERITONEUM/RETROPERITONEUM: Right paracolic haziness and stranding. Cannot exclude an infiltrative process involving the peritoneum. - a/p oral contrast (10/12/17): There is bilateral hydronephrosis, moderate on the right and moderate to severe on the left. The degree of hydronephrosis appears increased from prior evaluation and the configuration of hydronephrosis suggests underlying UPJ obstructions. Correlate clinically. There is a 3 mm calculus identified within the right renal pelvis. No left-sided renal calculi are noted. However, there is asymmetric infiltration of the left perirenal fat and thickening of adjacent intraperitoneal fascial planes. 2 small calcifications in each measuring less than 3 mm are identified layering posteriorly within the distended urinary bladder, likely related to passed calculi; left-sided pyelonephritis cannot be excluded.  PMHx: HTN, HLD, GERD, DM 2 SxHx: Hysterectomy (2003), breast surgery FHx: Ovarian cancer (mother), CAD (brother) SocHx: Current smoker (half pack per day x 50 years) Allergies: NKDA, Lipitor gave her myalgias  The patient denies fevers, chills, nausea and or vomiting and no unexplained weight loss. All pertinent laboratory, films and physician notes were reviewed.

## 2025-01-15 NOTE — ASSESSMENT
[FreeTextEntry1] : 74F with longstanding bilateral hydronephrosis, recently noted worsening of left side. Patient has a 1.3 cm stone noted on the right side. Patient was noted to have significant cystocele on exam with medialization of her UOs. Renal scan shows no interval change in the last 10 years though persistent obstruction noted. Negative hematuria workup completed 10/9/2024.  Patient s/p bilateral ureteral stent placement on 10/30/2024.  History of vaginal vault prolapse.  Presents today with urinary frequency and urgency.  -Hospital labs and imaging reviewed -Ultrasound and MRI images reviewed from present to 2017 -CT urogram reviewed -patient understands she needs to follow-up with her PCP regarding the lesion in the lung that was noted -Renal lasix scan reviewed -UDS performed by Dr. Jurado on 12/30/2024 revealed small bladder capacity unsure if related to bilateral stents versus OAB -UA/Ucx history reviewed, 12/30/2024 Ucx negative -UCx today -Office cystoscopy neg for malignancy 10/9/24 - significant prolapse noted w/ medialization of UOs -Ultrasound kidney and bladder ordered to assess degree of hydronephrosis -D/C Gemtesa -Start Myrbetriq, if too expensive can trial trospium -Follow-up with Dr. Bearden for pessary fitting   Rose Kwok MD Chief of Urology, [address], [facility] [address] [place] [address] Phone: [phone] Fax: [phone]

## 2025-01-15 NOTE — PHYSICAL EXAM
[Normal Appearance] : normal appearance [Well Groomed] : well groomed [General Appearance - In No Acute Distress] : no acute distress [Edema] : no peripheral edema [Respiration, Rhythm And Depth] : normal respiratory rhythm and effort [Exaggerated Use Of Accessory Muscles For Inspiration] : no accessory muscle use [Costovertebral Angle Tenderness] : no ~M costovertebral angle tenderness [Normal Station and Gait] : the gait and station were normal for the patient's age [] : no rash [No Focal Deficits] : no focal deficits [Oriented To Time, Place, And Person] : oriented to person, place, and time [Affect] : the affect was normal [Mood] : the mood was normal [General Appearance - Well Developed] : well developed [General Appearance - Well Nourished] : well nourished [Skin Color & Pigmentation] : normal skin color and pigmentation [Skin Turgor] : supple [de-identified] : Flat, nondistended [de-identified] : Suprapubic tenderness

## 2025-01-15 NOTE — HISTORY OF PRESENT ILLNESS
[None] : no symptoms [Urinary Urgency] : urinary urgency [Urinary Frequency] : urinary frequency [FreeTextEntry1] : Referring Provider: Dr. Park Esquivel Chief Complaint: Bilateral hydronephrosis and nephrolithiasis Date of first visit: 09/09/2024  DINAH SAHU is a 74 year old [place] woman with a PMHx of HTN, HLD, DM2 who presents for evaluation of bilateral hydronephrosis. Patient states that she is only done this fairly recently, but review of her imaging in PACS show that she has had bilateral hydronephrosis since as early as 2017. At that point in time was greater on the right side than the left, though most recent imaging showed that it is no greater on the left than right. Patient has been completely asymptomatic. Has no history of passing a stone though 1.3 cm stone has been seen on the right side this increase in size since previously noted. Patient reports an episode of gross hematuria in 2012 that she believes might have been associated with an infection but is unclear. She also has been seen to have significant amounts of microscopic hematuria but denies any formal hematuria workup. Patient is completely asymptomatic and denies any history of flank pain. Denies recurrent UTIs.  Negative hematuria workup as of 10/9/2024. On examination, patient noted to have cystocele that is causing medialization of ureteral orifice. This may be the cause of her bilateral hydronephrosis due to ureteral kinking. Renal scan reviewed. Evidence of obstruction though stable.  As of 10/28/24, patient presents today s/p ER evaluation on 10/14/24 for right renal colic. CT showed right hemorrhage within collecting system, possible due to intrarenal stone. Hx of bilateral hydronephrosis and cystocele, underwent in office cysto on 10/9/24. Discharged home with close follow up. UA/Ucx ordered on 10/24/24 after patient called office for complaints of UTI symptoms with no reported gross hematuria. +Ucx positive, abx sent. Today, she presents to the office pale and ill-appearing with reports of gross hematuria, currently describing urine as a burgundy color that began 10/26/24, accompanied by dizziness, weakness and left flank pain radiating to LLQ. She reports one episode of vomiting on Saturday. Denies fever, although has not checked her temperature. She denies chest pain or sob.  As of 1/13/2025, patient presents today for follow-up.  Patient is status post bilateral ureteral stent placement on 10/30/2024.  History of unexplained bilateral hydronephrosis and UPJ obstruction. She was seen by Dr. Jurado on 12/30/2024 and underwent UDS.  Patient was noted to small bladder capacity, unsure if related to bilateral stents vs OAB.  She was sent home with Gemtesa for OAB symptoms, however patient reports medication is too expensive and has not continued treatment.  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  Patient was also advised to undergo renal ultrasound, pending imaging date. She presents today with complaints of suprapubic pain, as well as urinary urgency and frequency. Reports occasional nausea, which patient endorses is triggered by stent pain.  She denies fever or gross hematuria.  Denies leaking or sensation of incomplete bladder emptying.  Ucx Hx 12/30/2024 Negative 10/25/24: Prelim <100,000 Gram Negative Rods  Renal Lasix Scan (10/02/24): -Following diuretic challenge, the T-1/2 washout from the left collecting system is 32 minutes; the T1/2 washout from the right collecting system is 16 minutes (normal:10 minutes or less). -Differential renal function: Right kidney: 48% (previously: 46%) ; Left kidney: 52% (previously: 54%). -Normal flow to and function of the LEFT kidney with evidence of obstruction. Allowing for differences in technique there has been no significant interval change in function or in response to diuretic challenge compared to the previous study of 12/28/2011. -Normal flow to and function of the RIGHT kidney which demonstrates an equivocal response to diuretic challenge. Partial or low-grade obstruction cannot be excluded. Allowing for differences in technique there has been no significant interval change in function or in response to diuretic challenge compared to the previous study of 12/28/2011.  US Hx: - renal/bladder (8/16/2024): Right kidney: 12.2 cm. A 1.3 cm obstructing calculus is seen in the renal pelvis producing moderate right hydronephrosis. This is more pronounced when compared to prior study. Left kidney: 11.6 cm. Severe left hydronephrosis is appreciated. No discrete renal calculus is seen. Distal obstruction cannot be excluded. Urinary bladder: The bladder is moderately distended. No discrete bladder calculus is seen. No significant focal bladder wall thickening is seen. A prevoid bladder volume of 234 cc is noted. A post void bladder volume of 80 cc is noted.  - renal/slgrscn14/22/20): Right kidney: 11.3 cm. A 1.1 cm calculus is seen in the right renal pelvis. Mild right hydronephrosis is seen. This is without significant change from prior study. Left kidney: 11.3 cm. Mild to moderate left hydronephrosis is seen. Distal obstruction cannot be excluded. This is also without significant change from prior study. Urinary bladder: The bladder is mildly distended. No focal bladder wall thickening is seen. No discrete bladder calculus is noted. A prevoid bladder volume of 158 cc is noted. A post void bladder volume of 66 cc is noted.  -Renal/bladder (12/18/17): Right kidney: 10.5 cm. Mild to moderate right hydronephrosis is seen. 6 mm calculus is seen in the right renal pelvis. Left kidney: 10.4 cm. Moderate left hydronephrosis is seen. No discrete renal calculus is seen. Distal obstruction cannot be excluded. Urinary bladder: The bladder was empty limiting evaluation.  MRI Hx: -abd (3/17/2021): Stable bilateral hydronephrosis, mild to moderate on the right and moderate on the left. There is a stable small nonobstructive calculus in the dependent right renal pelvis.  CT Hx: -a/p contrast (10/14/24): KIDNEYS/URETERS: Moderately severe right and moderate left-sided dilatation of the renal pelvis and fullness of the calyces, essentially unchanged. There has been interval development of hyperdense material within the right renal infundibula and renal pelvis suggestive of hemorrhagic content. There is an 8 mm sized nonobstructing calculus within the dilated right renal pelvis and a 3 mm sized nonobstructing calculus within the right lower pole renal calyx. Mural enhancement of the right renal pelvis and proximal right ureter is noted suggesting pyelitis and ureteritis. BLADDER: Under distended. Although mural thickening may be related to underdistention, there is perivesicular haziness concerning for cystitis. REPRODUCTIVE ORGANS: Hysterectomy. BOWEL: No bowel obstruction. Appendix is normal. Uncomplicated sigmoid diverticula. Rectocele. PERITONEUM/RETROPERITONEUM: Right paracolic haziness and stranding. Cannot exclude an infiltrative process involving the peritoneum. - a/p oral contrast (10/12/17): There is bilateral hydronephrosis, moderate on the right and moderate to severe on the left. The degree of hydronephrosis appears increased from prior evaluation and the configuration of hydronephrosis suggests underlying UPJ obstructions. Correlate clinically. There is a 3 mm calculus identified within the right renal pelvis. No left-sided renal calculi are noted. However, there is asymmetric infiltration of the left perirenal fat and thickening of adjacent intraperitoneal fascial planes. 2 small calcifications in each measuring less than 3 mm are identified layering posteriorly within the distended urinary bladder, likely related to passed calculi; left-sided pyelonephritis cannot be excluded.  PMHx: HTN, HLD, GERD, DM 2 SxHx: Hysterectomy (2003), breast surgery FHx: Ovarian cancer (mother), CAD (brother) SocHx: Current smoker (half pack per day x 50 years) Allergies: NKDA, Lipitor gave her myalgias  The patient denies fevers, chills, nausea and or vomiting and no unexplained weight loss. All pertinent laboratory, films and physician notes were reviewed.

## 2025-01-15 NOTE — ASSESSMENT
[FreeTextEntry1] : 74F with longstanding bilateral hydronephrosis, recently noted worsening of left side. Patient has a 1.3 cm stone noted on the right side. Patient was noted to have significant cystocele on exam with medialization of her UOs. Renal scan shows no interval change in the last 10 years though persistent obstruction noted. Negative hematuria workup completed 10/9/2024.  Patient s/p bilateral ureteral stent placement on 10/30/2024.  History of vaginal vault prolapse.  Presents today with urinary frequency and urgency.  -Hospital labs and imaging reviewed -Ultrasound and MRI images reviewed from present to 2017 -CT urogram reviewed -patient understands she needs to follow-up with her PCP regarding the lesion in the lung that was noted -Renal lasix scan reviewed -UDS performed by Dr. Jurado on 12/30/2024 revealed small bladder capacity unsure if related to bilateral stents versus OAB -UA/Ucx history reviewed, 12/30/2024 Ucx negative -UCx today -Office cystoscopy neg for malignancy 10/9/24 - significant prolapse noted w/ medialization of UOs -Ultrasound kidney and bladder ordered to assess degree of hydronephrosis -D/C Gemtesa -Start Myrbetriq, if too expensive can trial trospium -Follow-up with Dr. Bearden for pessary fitting   Rose Kwok MD Chief of Urology, [address], [facility] [address]tr[address] [place] [address] Phone: [phone] Fax: [phone]

## 2025-01-15 NOTE — ASSESSMENT
[FreeTextEntry1] : 74F with longstanding bilateral hydronephrosis, recently noted worsening of left side. Patient has a 1.3 cm stone noted on the right side. Patient was noted to have significant cystocele on exam with medialization of her UOs. Renal scan shows no interval change in the last 10 years though persistent obstruction noted. Negative hematuria workup completed 10/9/2024.  Patient s/p bilateral ureteral stent placement on 10/30/2024.  History of vaginal vault prolapse.  Presents today with urinary frequency and urgency.  -Hospital labs and imaging reviewed -Ultrasound and MRI images reviewed from present to 2017 -CT urogram reviewed -patient understands she needs to follow-up with her PCP regarding the lesion in the lung that was noted -Renal lasix scan reviewed -UDS performed by Dr. Jurado on 12/30/2024 revealed small bladder capacity unsure if related to bilateral stents versus OAB -UA/Ucx history reviewed, 12/30/2024 Ucx negative -UCx today -Office cystoscopy neg for malignancy 10/9/24 - significant prolapse noted w/ medialization of UOs -Ultrasound kidney and bladder ordered to assess degree of hydronephrosis -D/C Gemtesa -Start Myrbetriq, if too expensive can trial trospium -Follow-up with Dr. Bearden for pessary fitting   Rose Kwok MD Chief of Urology, [address], [facility] [address]tr[address] [place] [address] Phone: [phone] Fax: [phone]

## 2025-01-15 NOTE — PHYSICAL EXAM
[Normal Appearance] : normal appearance [Well Groomed] : well groomed [General Appearance - In No Acute Distress] : no acute distress [Edema] : no peripheral edema [Respiration, Rhythm And Depth] : normal respiratory rhythm and effort [Exaggerated Use Of Accessory Muscles For Inspiration] : no accessory muscle use [Costovertebral Angle Tenderness] : no ~M costovertebral angle tenderness [Normal Station and Gait] : the gait and station were normal for the patient's age [] : no rash [No Focal Deficits] : no focal deficits [Oriented To Time, Place, And Person] : oriented to person, place, and time [Affect] : the affect was normal [Mood] : the mood was normal [General Appearance - Well Developed] : well developed [General Appearance - Well Nourished] : well nourished [Skin Color & Pigmentation] : normal skin color and pigmentation [Skin Turgor] : supple [de-identified] : Flat, nondistended [de-identified] : Suprapubic tenderness

## 2025-01-15 NOTE — PHYSICAL EXAM
[Normal Appearance] : normal appearance [Well Groomed] : well groomed [General Appearance - In No Acute Distress] : no acute distress [Edema] : no peripheral edema [Respiration, Rhythm And Depth] : normal respiratory rhythm and effort [Exaggerated Use Of Accessory Muscles For Inspiration] : no accessory muscle use [Costovertebral Angle Tenderness] : no ~M costovertebral angle tenderness [Normal Station and Gait] : the gait and station were normal for the patient's age [] : no rash [No Focal Deficits] : no focal deficits [Oriented To Time, Place, And Person] : oriented to person, place, and time [Affect] : the affect was normal [Mood] : the mood was normal [General Appearance - Well Developed] : well developed [General Appearance - Well Nourished] : well nourished [Skin Color & Pigmentation] : normal skin color and pigmentation [Skin Turgor] : supple [de-identified] : Flat, nondistended [de-identified] : Suprapubic tenderness

## 2025-02-13 NOTE — REASON FOR VISIT
RECIEVED PT FROM NIGHT NURSE. PT IS LAYING DOWN IN BED WITH HOB UP. PT
COMPLAINING OF BACK PAIN. PT REPOSITIONED AND MADE COMFORTABLE IN BED. PT
LOOKS TO BE IN NO ACUTE DISTRESS AT THIS TIME. IV SITE LOOKS PATENT WITH NO
SIGNS OF ERYTHEMA OR SWELLING. BED IN LOWEST POSITION. CALL LIGHT WITHIN
REACH. WILL CONTINUE TO MONTIOR. [Follow-up Visit ___] : a follow-up visit  for [unfilled]

## 2025-02-14 NOTE — PHYSICAL EXAM
[Normal Appearance] : normal appearance [Well Groomed] : well groomed [General Appearance - In No Acute Distress] : no acute distress [Edema] : no peripheral edema [Respiration, Rhythm And Depth] : normal respiratory rhythm and effort [Exaggerated Use Of Accessory Muscles For Inspiration] : no accessory muscle use [Abdomen Soft] : soft [Abdomen Tenderness] : non-tender [Urinary Bladder Findings] : the bladder was normal on palpation [Normal Station and Gait] : the gait and station were normal for the patient's age [] : no rash [No Focal Deficits] : no focal deficits [Oriented To Time, Place, And Person] : oriented to person, place, and time [Affect] : the affect was normal [Mood] : the mood was normal [Chaperone Present] : A chaperone was present in the examining room during all aspects of the physical examination [72200] : A chaperone was present during the pelvic exam. [de-identified] : Vaginal vault prolapse.  Pessary ring with support size #2 fitted and placed without difficulty. PVR 55 cc [FreeTextEntry2] : Adia Ahn NP

## 2025-02-14 NOTE — HISTORY OF PRESENT ILLNESS
[FreeTextEntry1] : 74F presents today for follow-up for pessary fitting.  History of prolapse of vaginal vault. History of unexplained bilateral hydronephrosis.  Office cystoscopy done with Dr. Kwok on 10/9/2024 revealed significant prolapse noted with medialization of UOs.  Status post bilateral ureteral stent placement on 10/30/2024 with Dr. Kwok. Patient currently reports sensation of organ prolapse and leaking of urine.  Patient reports she often has to reduce prolapse to relieve suprapubic and vaginal pain. Denies UTI symptoms.

## 2025-02-14 NOTE — PHYSICAL EXAM
[Normal Appearance] : normal appearance [Well Groomed] : well groomed [General Appearance - In No Acute Distress] : no acute distress [Edema] : no peripheral edema [Respiration, Rhythm And Depth] : normal respiratory rhythm and effort [Exaggerated Use Of Accessory Muscles For Inspiration] : no accessory muscle use [Abdomen Soft] : soft [Abdomen Tenderness] : non-tender [Urinary Bladder Findings] : the bladder was normal on palpation [Normal Station and Gait] : the gait and station were normal for the patient's age [] : no rash [No Focal Deficits] : no focal deficits [Oriented To Time, Place, And Person] : oriented to person, place, and time [Affect] : the affect was normal [Mood] : the mood was normal [Chaperone Present] : A chaperone was present in the examining room during all aspects of the physical examination [75137] : A chaperone was present during the pelvic exam. [de-identified] : Vaginal vault prolapse.  Pessary ring with support size #2 fitted and placed without difficulty. PVR 55 cc [FreeTextEntry2] : Adia Ahn NP

## 2025-02-14 NOTE — ASSESSMENT
[FreeTextEntry1] : 74F with history of vaginal vault prolapse and unexplained bilateral hydronephrosis.  S/p bilateral ureteral stent placement on 10/30/2024 with Dr. Kwok.  Ultrasound reviewed with patient Pessary with support size #2 placed.  Patient denies pain/discomfort, tolerated procedure well.  PVR 55 cc RTC in 3 months for pessary maintenance Discussed with Dr. Kwok, patient to follow up with her in 2 weeks for repeat US and stent exchange

## 2025-03-11 NOTE — HISTORY OF PRESENT ILLNESS
[FreeTextEntry1] : Referring Provider: Dr. Park Esquivel Chief Complaint: Bilateral hydronephrosis and nephrolithiasis Date of first visit: 09/09/2024  DINAH SAHU is a 74 year old [place] woman with a PMHx of HTN, HLD, DM2 who presents for evaluation of bilateral hydronephrosis. Patient states that she is only done this fairly recently, but review of her imaging in PACS show that she has had bilateral hydronephrosis since as early as 2017. At that point in time was greater on the right side than the left, though most recent imaging showed that it is no greater on the left than right. Patient has been completely asymptomatic. Has no history of passing a stone though 1.3 cm stone has been seen on the right side this increase in size since previously noted. Patient reports an episode of gross hematuria in 2012 that she believes might have been associated with an infection but is unclear. She also has been seen to have significant amounts of microscopic hematuria but denies any formal hematuria workup. Patient is completely asymptomatic and denies any history of flank pain. Denies recurrent UTIs.  Negative hematuria workup as of 10/9/2024. On examination, patient noted to have cystocele that is causing medialization of ureteral orifice. This may be the cause of her bilateral hydronephrosis due to ureteral kinking. Renal scan reviewed. Evidence of obstruction though stable.  As of 10/28/24, patient presents today s/p ER evaluation on 10/14/24 for right renal colic. CT showed right hemorrhage within collecting system, possible due to intrarenal stone. Hx of bilateral hydronephrosis and cystocele, underwent in office cysto on 10/9/24. Discharged home with close follow up. UA/Ucx ordered on 10/24/24 after patient called office for complaints of UTI symptoms with no reported gross hematuria. +Ucx positive, abx sent. Today, she presents to the office pale and ill-appearing with reports of gross hematuria, currently describing urine as a burgundy color that began 10/26/24, accompanied by dizziness, weakness and left flank pain radiating to LLQ. She reports one episode of vomiting on Saturday. Denies fever, although has not checked her temperature. She denies chest pain or sob.  As of 1/13/2025, patient presents today for follow-up. Patient is status post bilateral ureteral stent placement on 10/30/2024. History of unexplained bilateral hydronephrosis and UPJ obstruction. She was seen by Dr. Jurado on 12/30/2024 and underwent UDS. Patient was noted to small bladder capacity, unsure if related to bilateral stents vs OAB. She was sent home with Gemtesa for OAB symptoms, however patient reports medication is too expensive and has not continued treatment. 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 Patient was also advised to undergo renal ultrasound, pending imaging date. She presents today with complaints of suprapubic pain, as well as urinary urgency and frequency. Reports occasional nausea, which patient endorses is triggered by stent pain. She denies fever or gross hematuria. Denies leaking or sensation of incomplete bladder emptying.  3/10/25: Patient presents today with complaints of burning with urination. Describes intermittent episodes of burning sensation to vulvar region as she urinates. Has tried several OTC products, such as Desitin, Aquaphor and vaseline for the discomfort with no relief. She continues to use diapers daily due to occasional urge incontinence. Denies issues with pessary placement. She denies flank pain or recent UTI symptoms.   Ucx Hx 12/30/2024 Negative 10/25/24: Prelim <100,000 Gram Negative Rods  Renal Lasix Scan (10/02/24): -Following diuretic challenge, the T-1/2 washout from the left collecting system is 32 minutes; the T1/2 washout from the right collecting system is 16 minutes (normal:10 minutes or less). -Differential renal function: Right kidney: 48% (previously: 46%) ; Left kidney: 52% (previously: 54%). -Normal flow to and function of the LEFT kidney with evidence of obstruction. Allowing for differences in technique there has been no significant interval change in function or in response to diuretic challenge compared to the previous study of 12/28/2011. -Normal flow to and function of the RIGHT kidney which demonstrates an equivocal response to diuretic challenge. Partial or low-grade obstruction cannot be excluded. Allowing for differences in technique there has been no significant interval change in function or in response to diuretic challenge compared to the previous study of 12/28/2011.  US Hx: - renal/bladder (8/16/2024): Right kidney: 12.2 cm. A 1.3 cm obstructing calculus is seen in the renal pelvis producing moderate right hydronephrosis. This is more pronounced when compared to prior study. Left kidney: 11.6 cm. Severe left hydronephrosis is appreciated. No discrete renal calculus is seen. Distal obstruction cannot be excluded. Urinary bladder: The bladder is moderately distended. No discrete bladder calculus is seen. No significant focal bladder wall thickening is seen. A prevoid bladder volume of 234 cc is noted. A post void bladder volume of 80 cc is noted.  - renal/aemflkw58/22/20): Right kidney: 11.3 cm. A 1.1 cm calculus is seen in the right renal pelvis. Mild right hydronephrosis is seen. This is without significant change from prior study. Left kidney: 11.3 cm. Mild to moderate left hydronephrosis is seen. Distal obstruction cannot be excluded. This is also without significant change from prior study. Urinary bladder: The bladder is mildly distended. No focal bladder wall thickening is seen. No discrete bladder calculus is noted. A prevoid bladder volume of 158 cc is noted. A post void bladder volume of 66 cc is noted.  -Renal/bladder (12/18/17): Right kidney: 10.5 cm. Mild to moderate right hydronephrosis is seen. 6 mm calculus is seen in the right renal pelvis. Left kidney: 10.4 cm. Moderate left hydronephrosis is seen. No discrete renal calculus is seen. Distal obstruction cannot be excluded. Urinary bladder: The bladder was empty limiting evaluation.  MRI Hx: -abd (3/17/2021): Stable bilateral hydronephrosis, mild to moderate on the right and moderate on the left. There is a stable small nonobstructive calculus in the dependent right renal pelvis.  CT Hx: -a/p contrast (10/14/24): KIDNEYS/URETERS: Moderately severe right and moderate left-sided dilatation of the renal pelvis and fullness of the calyces, essentially unchanged. There has been interval development of hyperdense material within the right renal infundibula and renal pelvis suggestive of hemorrhagic content. There is an 8 mm sized nonobstructing calculus within the dilated right renal pelvis and a 3 mm sized nonobstructing calculus within the right lower pole renal calyx. Mural enhancement of the right renal pelvis and proximal right ureter is noted suggesting pyelitis and ureteritis. BLADDER: Under distended. Although mural thickening may be related to underdistention, there is perivesicular haziness concerning for cystitis. REPRODUCTIVE ORGANS: Hysterectomy. BOWEL: No bowel obstruction. Appendix is normal. Uncomplicated sigmoid diverticula. Rectocele. PERITONEUM/RETROPERITONEUM: Right paracolic haziness and stranding. Cannot exclude an infiltrative process involving the peritoneum. - a/p oral contrast (10/12/17): There is bilateral hydronephrosis, moderate on the right and moderate to severe on the left. The degree of hydronephrosis appears increased from prior evaluation and the configuration of hydronephrosis suggests underlying UPJ obstructions. Correlate clinically. There is a 3 mm calculus identified within the right renal pelvis. No left-sided renal calculi are noted. However, there is asymmetric infiltration of the left perirenal fat and thickening of adjacent intraperitoneal fascial planes. 2 small calcifications in each measuring less than 3 mm are identified layering posteriorly within the distended urinary bladder, likely related to passed calculi; left-sided pyelonephritis cannot be excluded.  PMHx: HTN, HLD, GERD, DM 2 SxHx: Hysterectomy (2003), breast surgery FHx: Ovarian cancer (mother), CAD (brother) SocHx: Current smoker (half pack per day x 50 years) Allergies: NKDA, Lipitor gave her myalgias  The patient denies fevers, chills, nausea and or vomiting and no unexplained weight loss. All pertinent laboratory, films and physician notes were reviewed.    Patient is currently experiencing no symptoms, urinary urgency and urinary frequency.

## 2025-03-11 NOTE — PHYSICAL EXAM
[Normal Appearance] : normal appearance [Well Groomed] : well groomed [General Appearance - In No Acute Distress] : no acute distress [Edema] : no peripheral edema [Respiration, Rhythm And Depth] : normal respiratory rhythm and effort [Exaggerated Use Of Accessory Muscles For Inspiration] : no accessory muscle use [Abdomen Soft] : soft [Abdomen Tenderness] : non-tender [Costovertebral Angle Tenderness] : no ~M costovertebral angle tenderness [Urethral Meatus] : normal urethra [Normal Station and Gait] : the gait and station were normal for the patient's age [] : no rash [No Focal Deficits] : no focal deficits [Oriented To Time, Place, And Person] : oriented to person, place, and time [Affect] : the affect was normal [Mood] : the mood was normal [Chaperone Present] : A chaperone was present in the examining room during all aspects of the physical examination [General Appearance - Well Developed] : well developed [General Appearance - Well Nourished] : well nourished [de-identified] : Erythematous muscosa noted to vulvar and perianal region.  No discharge noted.  [FreeTextEntry2] : Adia Ahn NP

## 2025-03-11 NOTE — ASSESSMENT
[FreeTextEntry1] : 74F with longstanding bilateral hydronephrosis, recently noted worsening of left side. Patient has a 1.3 cm stone noted on the right side. Patient was noted to have significant cystocele on exam with medialization of her UOs.  Was replaced with Dr. Jurado.  Renal scan shows no interval change in the last 10 years though persistent obstruction noted. Negative hematuria workup completed 10/9/2024. Patient s/p bilateral ureteral stent placement on 10/30/2024.  Most recent renal bladder ultrasound shows mild/moderate bilateral hydronephrosis with presence of ureteral stents. Presents today vulvar irritation and burning.  -Hospital labs and imaging reviewed -Ultrasound and MRI images reviewed from present to 2017 -CT urogram reviewed -patient understands she needs to follow-up with her PCP regarding the lesion in the lung that was noted -Renal lasix scan reviewed -UDS performed by Dr. Jurado on 12/30/2024 revealed small bladder capacity unsure if related to bilateral stents versus OAB -UA/Ucx history reviewed -Office cystoscopy neg for malignancy 10/9/24 - significant prolapse noted w/ medialization of UOs -Ultrasound kidney and bladder shows improvement in bilateral hydronephrosis and right renal calculus -UA/UCx today - if negative for infection, can plan for an office left ureteral stent removal  -Clotrimazole-betamethasone cream sent -Desert harvest 4% lidocaine gel as needed -RTC in 1 week with NP to evaluate vulvar region  Rose Kwok MD Chief of Urology, [address], [place] [address] [place] [address] Phone: [phone] Fax: [phone].

## 2025-03-11 NOTE — ASSESSMENT
[FreeTextEntry1] : 74F with longstanding bilateral hydronephrosis, recently noted worsening of left side. Patient has a 1.3 cm stone noted on the right side. Patient was noted to have significant cystocele on exam with medialization of her UOs.  Was replaced with Dr. Jruado.  Renal scan shows no interval change in the last 10 years though persistent obstruction noted. Negative hematuria workup completed 10/9/2024. Patient s/p bilateral ureteral stent placement on 10/30/2024.  Most recent renal bladder ultrasound shows mild/moderate bilateral hydronephrosis with presence of ureteral stents. Presents today vulvar irritation and burning.  -Hospital labs and imaging reviewed -Ultrasound and MRI images reviewed from present to 2017 -CT urogram reviewed -patient understands she needs to follow-up with her PCP regarding the lesion in the lung that was noted -Renal lasix scan reviewed -UDS performed by Dr. Jurado on 12/30/2024 revealed small bladder capacity unsure if related to bilateral stents versus OAB -UA/Ucx history reviewed -Office cystoscopy neg for malignancy 10/9/24 - significant prolapse noted w/ medialization of UOs -Ultrasound kidney and bladder shows improvement in bilateral hydronephrosis and right renal calculus -UA/UCx today - if negative for infection, can plan for an office left ureteral stent removal  -Clotrimazole-betamethasone cream sent -Desert harvest 4% lidocaine gel as needed -RTC in 1 week with NP to evaluate vulvar region  Rose Kwok MD Chief of Urology, [address], [place] [address] [place] [address] Phone: [phone] Fax: [phone].

## 2025-03-11 NOTE — HISTORY OF PRESENT ILLNESS
[FreeTextEntry1] : Referring Provider: Dr. Park Esquivel Chief Complaint: Bilateral hydronephrosis and nephrolithiasis Date of first visit: 09/09/2024  DINAH SAHU is a 74 year old Malay woman with a PMHx of HTN, HLD, DM2 who presents for evaluation of bilateral hydronephrosis. Patient states that she is only done this fairly recently, but review of her imaging in PACS show that she has had bilateral hydronephrosis since as early as 2017. At that point in time was greater on the right side than the left, though most recent imaging showed that it is no greater on the left than right. Patient has been completely asymptomatic. Has no history of passing a stone though 1.3 cm stone has been seen on the right side this increase in size since previously noted. Patient reports an episode of gross hematuria in 2012 that she believes might have been associated with an infection but is unclear. She also has been seen to have significant amounts of microscopic hematuria but denies any formal hematuria workup. Patient is completely asymptomatic and denies any history of flank pain. Denies recurrent UTIs.  Negative hematuria workup as of 10/9/2024. On examination, patient noted to have cystocele that is causing medialization of ureteral orifice. This may be the cause of her bilateral hydronephrosis due to ureteral kinking. Renal scan reviewed. Evidence of obstruction though stable.  As of 10/28/24, patient presents today s/p ER evaluation on 10/14/24 for right renal colic. CT showed right hemorrhage within collecting system, possible due to intrarenal stone. Hx of bilateral hydronephrosis and cystocele, underwent in office cysto on 10/9/24. Discharged home with close follow up. UA/Ucx ordered on 10/24/24 after patient called office for complaints of UTI symptoms with no reported gross hematuria. +Ucx positive, abx sent. Today, she presents to the office pale and ill-appearing with reports of gross hematuria, currently describing urine as a burgundy color that began 10/26/24, accompanied by dizziness, weakness and left flank pain radiating to LLQ. She reports one episode of vomiting on Saturday. Denies fever, although has not checked her temperature. She denies chest pain or sob.  As of 1/13/2025, patient presents today for follow-up. Patient is status post bilateral ureteral stent placement on 10/30/2024. History of unexplained bilateral hydronephrosis and UPJ obstruction. She was seen by Dr. Jurado on 12/30/2024 and underwent UDS. Patient was noted to small bladder capacity, unsure if related to bilateral stents vs OAB. She was sent home with Gemtesa for OAB symptoms, however patient reports medication is too expensive and has not continued treatment. 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 Patient was also advised to undergo renal ultrasound, pending imaging date. She presents today with complaints of suprapubic pain, as well as urinary urgency and frequency. Reports occasional nausea, which patient endorses is triggered by stent pain. She denies fever or gross hematuria. Denies leaking or sensation of incomplete bladder emptying.  3/10/25: Patient presents today with complaints of burning with urination. Describes intermittent episodes of burning sensation to vulvar region as she urinates. Has tried several OTC products, such as Desitin, Aquaphor and vaseline for the discomfort with no relief. She continues to use diapers daily due to occasional urge incontinence. Denies issues with pessary placement. She denies flank pain or recent UTI symptoms.   Ucx Hx 12/30/2024 Negative 10/25/24: Prelim <100,000 Gram Negative Rods  Renal Lasix Scan (10/02/24): -Following diuretic challenge, the T-1/2 washout from the left collecting system is 32 minutes; the T1/2 washout from the right collecting system is 16 minutes (normal:10 minutes or less). -Differential renal function: Right kidney: 48% (previously: 46%) ; Left kidney: 52% (previously: 54%). -Normal flow to and function of the LEFT kidney with evidence of obstruction. Allowing for differences in technique there has been no significant interval change in function or in response to diuretic challenge compared to the previous study of 12/28/2011. -Normal flow to and function of the RIGHT kidney which demonstrates an equivocal response to diuretic challenge. Partial or low-grade obstruction cannot be excluded. Allowing for differences in technique there has been no significant interval change in function or in response to diuretic challenge compared to the previous study of 12/28/2011.  US Hx: - renal/bladder (8/16/2024): Right kidney: 12.2 cm. A 1.3 cm obstructing calculus is seen in the renal pelvis producing moderate right hydronephrosis. This is more pronounced when compared to prior study. Left kidney: 11.6 cm. Severe left hydronephrosis is appreciated. No discrete renal calculus is seen. Distal obstruction cannot be excluded. Urinary bladder: The bladder is moderately distended. No discrete bladder calculus is seen. No significant focal bladder wall thickening is seen. A prevoid bladder volume of 234 cc is noted. A post void bladder volume of 80 cc is noted.  - renal/kfcvyna59/22/20): Right kidney: 11.3 cm. A 1.1 cm calculus is seen in the right renal pelvis. Mild right hydronephrosis is seen. This is without significant change from prior study. Left kidney: 11.3 cm. Mild to moderate left hydronephrosis is seen. Distal obstruction cannot be excluded. This is also without significant change from prior study. Urinary bladder: The bladder is mildly distended. No focal bladder wall thickening is seen. No discrete bladder calculus is noted. A prevoid bladder volume of 158 cc is noted. A post void bladder volume of 66 cc is noted.  -Renal/bladder (12/18/17): Right kidney: 10.5 cm. Mild to moderate right hydronephrosis is seen. 6 mm calculus is seen in the right renal pelvis. Left kidney: 10.4 cm. Moderate left hydronephrosis is seen. No discrete renal calculus is seen. Distal obstruction cannot be excluded. Urinary bladder: The bladder was empty limiting evaluation.  MRI Hx: -abd (3/17/2021): Stable bilateral hydronephrosis, mild to moderate on the right and moderate on the left. There is a stable small nonobstructive calculus in the dependent right renal pelvis.  CT Hx: -a/p contrast (10/14/24): KIDNEYS/URETERS: Moderately severe right and moderate left-sided dilatation of the renal pelvis and fullness of the calyces, essentially unchanged. There has been interval development of hyperdense material within the right renal infundibula and renal pelvis suggestive of hemorrhagic content. There is an 8 mm sized nonobstructing calculus within the dilated right renal pelvis and a 3 mm sized nonobstructing calculus within the right lower pole renal calyx. Mural enhancement of the right renal pelvis and proximal right ureter is noted suggesting pyelitis and ureteritis. BLADDER: Under distended. Although mural thickening may be related to underdistention, there is perivesicular haziness concerning for cystitis. REPRODUCTIVE ORGANS: Hysterectomy. BOWEL: No bowel obstruction. Appendix is normal. Uncomplicated sigmoid diverticula. Rectocele. PERITONEUM/RETROPERITONEUM: Right paracolic haziness and stranding. Cannot exclude an infiltrative process involving the peritoneum. - a/p oral contrast (10/12/17): There is bilateral hydronephrosis, moderate on the right and moderate to severe on the left. The degree of hydronephrosis appears increased from prior evaluation and the configuration of hydronephrosis suggests underlying UPJ obstructions. Correlate clinically. There is a 3 mm calculus identified within the right renal pelvis. No left-sided renal calculi are noted. However, there is asymmetric infiltration of the left perirenal fat and thickening of adjacent intraperitoneal fascial planes. 2 small calcifications in each measuring less than 3 mm are identified layering posteriorly within the distended urinary bladder, likely related to passed calculi; left-sided pyelonephritis cannot be excluded.  PMHx: HTN, HLD, GERD, DM 2 SxHx: Hysterectomy (2003), breast surgery FHx: Ovarian cancer (mother), CAD (brother) SocHx: Current smoker (half pack per day x 50 years) Allergies: NKDA, Lipitor gave her myalgias  The patient denies fevers, chills, nausea and or vomiting and no unexplained weight loss. All pertinent laboratory, films and physician notes were reviewed.    Patient is currently experiencing no symptoms, urinary urgency and urinary frequency.

## 2025-03-11 NOTE — PHYSICAL EXAM
[Normal Appearance] : normal appearance [Well Groomed] : well groomed [General Appearance - In No Acute Distress] : no acute distress [Edema] : no peripheral edema [Respiration, Rhythm And Depth] : normal respiratory rhythm and effort [Exaggerated Use Of Accessory Muscles For Inspiration] : no accessory muscle use [Abdomen Soft] : soft [Abdomen Tenderness] : non-tender [Costovertebral Angle Tenderness] : no ~M costovertebral angle tenderness [Urethral Meatus] : normal urethra [Normal Station and Gait] : the gait and station were normal for the patient's age [] : no rash [No Focal Deficits] : no focal deficits [Oriented To Time, Place, And Person] : oriented to person, place, and time [Affect] : the affect was normal [Mood] : the mood was normal [Chaperone Present] : A chaperone was present in the examining room during all aspects of the physical examination [General Appearance - Well Developed] : well developed [General Appearance - Well Nourished] : well nourished [de-identified] : Erythematous muscosa noted to vulvar and perianal region.  No discharge noted.  [FreeTextEntry2] : Adia Ahn NP

## 2025-03-21 NOTE — HISTORY OF PRESENT ILLNESS
[FreeTextEntry1] : Patient presents today for follow-up for burning sensation to vulval region.   Was seen with Dr. Kwok on 3/10/2025 and diagnosed with vulvitis.  Continues to report itching and burning sensation to vulvar region, especially with urination.  Denies vaginal discharge.   Was prescribed clotrimazole-betamethasone lotion, however patient did not  medication.   Has continued to use OTC Desert harvest 4% lidocaine with aloe cream and A&D ointment, which patient reports has somewhat improved symptoms. Continues to use incontinence pads and/or Depends. States she is not bothered by occasional episodes of urinary incontinence.  Denies any UTI symptoms. 3/10/2025 Ucx Negative.

## 2025-03-21 NOTE — PHYSICAL EXAM
[Normal Appearance] : normal appearance [Well Groomed] : well groomed [General Appearance - In No Acute Distress] : no acute distress [Edema] : no peripheral edema [Respiration, Rhythm And Depth] : normal respiratory rhythm and effort [Exaggerated Use Of Accessory Muscles For Inspiration] : no accessory muscle use [Abdomen Soft] : soft [Abdomen Tenderness] : non-tender [Costovertebral Angle Tenderness] : no ~M costovertebral angle tenderness [Normal Station and Gait] : the gait and station were normal for the patient's age [] : no rash [No Focal Deficits] : no focal deficits [Oriented To Time, Place, And Person] : oriented to person, place, and time [Affect] : the affect was normal [Mood] : the mood was normal [Chaperone Present] : A chaperone was present in the examining room during all aspects of the physical examination [de-identified] : Erythematous mucosa noted to vulvar region with scant white discharge, patchy erythematous lesions noted to perineal and perianal region. [FreeTextEntry2] : Pam Angelo MA

## 2025-03-21 NOTE — PHYSICAL EXAM
[Normal Appearance] : normal appearance [Well Groomed] : well groomed [General Appearance - In No Acute Distress] : no acute distress [Edema] : no peripheral edema [Respiration, Rhythm And Depth] : normal respiratory rhythm and effort [Exaggerated Use Of Accessory Muscles For Inspiration] : no accessory muscle use [Abdomen Soft] : soft [Abdomen Tenderness] : non-tender [Costovertebral Angle Tenderness] : no ~M costovertebral angle tenderness [Normal Station and Gait] : the gait and station were normal for the patient's age [] : no rash [No Focal Deficits] : no focal deficits [Oriented To Time, Place, And Person] : oriented to person, place, and time [Affect] : the affect was normal [Mood] : the mood was normal [Chaperone Present] : A chaperone was present in the examining room during all aspects of the physical examination [de-identified] : Erythematous mucosa noted to vulvar region with scant white discharge, patchy erythematous lesions noted to perineal and perianal region. [FreeTextEntry2] : Pam Angelo MA

## 2025-03-21 NOTE — ASSESSMENT
[FreeTextEntry1] : 74F with vulvar redness, burning and itching, suspicious for fungal infection.  Did not start previously prescribed clotrimazole-betamethasone lotion.  -Previous lab values reviewed with patient -Recommend 100% cotton pads and underwear -Start clotrimazole-betamethasone lotion, medication resent -Follow up in 1 week

## 2025-05-06 NOTE — HISTORY OF PRESENT ILLNESS
[de-identified] : 74-year-old female presenting for annual health maintenance and physical examination. Patient has been seeing her urologist frequently due to bilateral hydronephrosis, which has been present since at least 2017. She underwent bilateral ureteral stent placement on October 30th, 2024. The patient reports severe pain associated with the stents, describing it as 'killers that nobody talks about.' She has experienced difficulty sitting down and constant pain for 5-6 months post-surgery. The pain has significantly affected her mood, causing anger and irritability. She also reports unintentional weight loss, currently weighing 92 lbs, down from 96 lbs a few months ago and 105-115 lbs a year and a half ago. The patient denies fever, chills, or night sweats. She experienced a period of extreme fatigue about two weeks ago, which improved after using electrolyte supplements. The patient continues to have burning sensation during urination. CT urogram (September 2024): Moderate to severe bilateral hydronephrosis worsened since 2021, normal caliber ureters, no obstructing stone or mass. Findings suggestive of ureterovesicular junction obstruction. Incidental 1.0 cm nodule in the posterior basilar segment of the right lower lobe, mildly increased in size compared to 2017. Previous imaging (2017) showed 0.6 cm nodule in the same location.

## 2025-05-06 NOTE — PHYSICAL EXAM
[Normal] : normal rate, regular rhythm, normal S1 and S2 and no murmur heard [Normal Supraclavicular Nodes] : no supraclavicular lymphadenopathy [Normal Axillary Nodes] : no axillary lymphadenopathy [Normal Posterior Cervical Nodes] : no posterior cervical lymphadenopathy [Normal Anterior Cervical Nodes] : no anterior cervical lymphadenopathy

## 2025-05-15 NOTE — HISTORY OF PRESENT ILLNESS
[FreeTextEntry1] : DINAH SAHU  74 year F presents for pessary maintenance. Vaginal pain has resolved. Continues to have bothersome incontinence. No issues with pessary    03/2025 Patient presents today for follow-up for burning sensation to vulval region.   Was seen with Dr. Kwok on 3/10/2025 and diagnosed with vulvitis.  Continues to report itching and burning sensation to vulvar region, especially with urination.  Denies vaginal discharge.   Was prescribed clotrimazole-betamethasone lotion, however patient did not  medication.   Has continued to use OTC Desert harvest 4% lidocaine with aloe cream and A&D ointment, which patient reports has somewhat improved symptoms. Continues to use incontinence pads and/or Depends. States she is not bothered by occasional episodes of urinary incontinence.  Denies any UTI symptoms. 3/10/2025 Ucx Negative.

## 2025-05-15 NOTE — PHYSICAL EXAM
[Normal Appearance] : normal appearance [Well Groomed] : well groomed [General Appearance - In No Acute Distress] : no acute distress [Edema] : no peripheral edema [Respiration, Rhythm And Depth] : normal respiratory rhythm and effort [Exaggerated Use Of Accessory Muscles For Inspiration] : no accessory muscle use [Abdomen Soft] : soft [Abdomen Tenderness] : non-tender [Costovertebral Angle Tenderness] : no ~M costovertebral angle tenderness [Urinary Bladder Findings] : the bladder was normal on palpation [Normal Station and Gait] : the gait and station were normal for the patient's age [] : no rash [No Focal Deficits] : no focal deficits [Oriented To Time, Place, And Person] : oriented to person, place, and time [Affect] : the affect was normal [Mood] : the mood was normal [No Palpable Adenopathy] : no palpable adenopathy [Chaperoned Physical Exam] : A chaperone was present in the examining room during all aspects of the physical examination. 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  Tender to exam requiring lidocaine gel for a thorough exam [FreeTextEntry2] :  Ora Boone M.A

## 2025-05-15 NOTE — ASSESSMENT
[FreeTextEntry1] : 73 y/o female with anterior vaginal wall prolapse Stage 2 Trial of pessry #2 with incontinence knob with good comfort and no incontinence. Voided well.  Will order and place when it arrives

## 2025-05-15 NOTE — ASSESSMENT
[FreeTextEntry1] : 75 y/o female with anterior vaginal wall prolapse Stage 2 Trial of pessry #2 with incontinence knob with good comfort and no incontinence. Voided well.  Will order and place when it arrives

## 2025-05-15 NOTE — PHYSICAL EXAM
[Normal Appearance] : normal appearance [Well Groomed] : well groomed [General Appearance - In No Acute Distress] : no acute distress [Edema] : no peripheral edema [Respiration, Rhythm And Depth] : normal respiratory rhythm and effort [Exaggerated Use Of Accessory Muscles For Inspiration] : no accessory muscle use [Abdomen Soft] : soft [Abdomen Tenderness] : non-tender [Costovertebral Angle Tenderness] : no ~M costovertebral angle tenderness [Urinary Bladder Findings] : the bladder was normal on palpation [Normal Station and Gait] : the gait and station were normal for the patient's age [] : no rash [No Focal Deficits] : no focal deficits [Oriented To Time, Place, And Person] : oriented to person, place, and time [Affect] : the affect was normal [Mood] : the mood was normal [No Palpable Adenopathy] : no palpable adenopathy [Chaperoned Physical Exam] : A chaperone was present in the examining room during all aspects of the physical examination. 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  Tender to exam requiring lidocaine gel for a thorough exam [FreeTextEntry2] :  Ora Boone M.A

## 2025-05-28 NOTE — HISTORY OF PRESENT ILLNESS
[FreeTextEntry1] : Referring Provider: Dr. Park Esquivel Chief Complaint: Bilateral hydronephrosis and nephrolithiasis Date of first visit: 09/09/2024  DINAH SAHU is a 74 year old Greek woman with a PMHx of HTN, HLD, DM2 who presents for evaluation of bilateral hydronephrosis. Patient states that she is only done this fairly recently, but review of her imaging in PACS show that she has had bilateral hydronephrosis since as early as 2017. At that point in time was greater on the right side than the left, though most recent imaging showed that it is no greater on the left than right. Patient has been completely asymptomatic. Has no history of passing a stone though 1.3 cm stone has been seen on the right side this increase in size since previously noted. Patient reports an episode of gross hematuria in 2012 that she believes might have been associated with an infection but is unclear. She also has been seen to have significant amounts of microscopic hematuria but denies any formal hematuria workup. Patient is completely asymptomatic and denies any history of flank pain. Denies recurrent UTIs.  Negative hematuria workup as of 10/9/2024. On examination, patient noted to have cystocele that is causing medialization of ureteral orifice. This may be the cause of her bilateral hydronephrosis due to ureteral kinking. Renal scan reviewed. Evidence of obstruction though stable.  As of 10/28/24, patient presents today s/p ER evaluation on 10/14/24 for right renal colic. CT showed right hemorrhage within collecting system, possible due to intrarenal stone. Hx of bilateral hydronephrosis and cystocele, underwent in office cysto on 10/9/24. Discharged home with close follow up. UA/Ucx ordered on 10/24/24 after patient called office for complaints of UTI symptoms with no reported gross hematuria. +Ucx positive, abx sent. Today, she presents to the office pale and ill-appearing with reports of gross hematuria, currently describing urine as a burgundy color that began 10/26/24, accompanied by dizziness, weakness and left flank pain radiating to LLQ. She reports one episode of vomiting on Saturday. Denies fever, although has not checked her temperature. She denies chest pain or sob.  As of 1/13/2025, patient presents today for follow-up. Patient is status post bilateral ureteral stent placement on 10/30/2024. History of unexplained bilateral hydronephrosis and UPJ obstruction. She was seen by Dr. Jurado on 12/30/2024 and underwent UDS. Patient was noted to small bladder capacity, unsure if related to bilateral stents vs OAB. She was sent home with Gemtesa for OAB symptoms, however patient reports medication is too expensive and has not continued treatment. 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 Patient was also advised to undergo renal ultrasound, pending imaging date. She presents today with complaints of suprapubic pain, as well as urinary urgency and frequency. Reports occasional nausea, which patient endorses is triggered by stent pain. She denies fever or gross hematuria. Denies leaking or sensation of incomplete bladder emptying.  3/10/25: Patient presents today with complaints of burning with urination. Describes intermittent episodes of burning sensation to vulvar region as she urinates. Has tried several OTC products, such as Desitin, Aquaphor and vaseline for the discomfort with no relief. She continues to use diapers daily due to occasional urge incontinence. Denies issues with pessary placement. She denies flank pain or recent UTI symptoms.  5/28/35: rash not resolved. Pt still reporting urge incontinence. No issue w/ pessary. Renal US shows improvement in hydro.   Ucx Hx 12/30/2024 Negative 10/25/24: Prelim <100,000 Gram Negative Rods  Renal Lasix Scan (10/02/24): -Following diuretic challenge, the T-1/2 washout from the left collecting system is 32 minutes; the T1/2 washout from the right collecting system is 16 minutes (normal:10 minutes or less). -Differential renal function: Right kidney: 48% (previously: 46%) ; Left kidney: 52% (previously: 54%). -Normal flow to and function of the LEFT kidney with evidence of obstruction. Allowing for differences in technique there has been no significant interval change in function or in response to diuretic challenge compared to the previous study of 12/28/2011. -Normal flow to and function of the RIGHT kidney which demonstrates an equivocal response to diuretic challenge. Partial or low-grade obstruction cannot be excluded. Allowing for differences in technique there has been no significant interval change in function or in response to diuretic challenge compared to the previous study of 12/28/2011.  US Hx: - renal/bladder (2/28/25): Right kidney: 11.5 cm. Mild/moderate hydronephrosis. 1.0 x 0.6 x 1.1 cm midpole shadowing calculus. Ureteral stent noted. Left kidney: 10.9 cm. Mild/moderate hydronephrosis. Ureteral stent noted. Urinary bladder: Bladder volume of 63 mL  - renal/bladder (8/16/2024): Right kidney: 12.2 cm. A 1.3 cm obstructing calculus is seen in the renal pelvis producing moderate right hydronephrosis. This is more pronounced when compared to prior study. Left kidney: 11.6 cm. Severe left hydronephrosis is appreciated. No discrete renal calculus is seen. Distal obstruction cannot be excluded. Urinary bladder: The bladder is moderately distended. No discrete bladder calculus is seen. No significant focal bladder wall thickening is seen. A prevoid bladder volume of 234 cc is noted. A post void bladder volume of 80 cc is noted.  - renal/siqtnuu22/22/20): Right kidney: 11.3 cm. A 1.1 cm calculus is seen in the right renal pelvis. Mild right hydronephrosis is seen. This is without significant change from prior study. Left kidney: 11.3 cm. Mild to moderate left hydronephrosis is seen. Distal obstruction cannot be excluded. This is also without significant change from prior study. Urinary bladder: The bladder is mildly distended. No focal bladder wall thickening is seen. No discrete bladder calculus is noted. A prevoid bladder volume of 158 cc is noted. A post void bladder volume of 66 cc is noted.  -Renal/bladder (12/18/17): Right kidney: 10.5 cm. Mild to moderate right hydronephrosis is seen. 6 mm calculus is seen in the right renal pelvis. Left kidney: 10.4 cm. Moderate left hydronephrosis is seen. No discrete renal calculus is seen. Distal obstruction cannot be excluded. Urinary bladder: The bladder was empty limiting evaluation.  MRI Hx: -abd (3/17/2021): Stable bilateral hydronephrosis, mild to moderate on the right and moderate on the left. There is a stable small nonobstructive calculus in the dependent right renal pelvis.  CT Hx: -a/p contrast (10/14/24): KIDNEYS/URETERS: Moderately severe right and moderate left-sided dilatation of the renal pelvis and fullness of the calyces, essentially unchanged. There has been interval development of hyperdense material within the right renal infundibula and renal pelvis suggestive of hemorrhagic content. There is an 8 mm sized nonobstructing calculus within the dilated right renal pelvis and a 3 mm sized nonobstructing calculus within the right lower pole renal calyx. Mural enhancement of the right renal pelvis and proximal right ureter is noted suggesting pyelitis and ureteritis. BLADDER: Under distended. Although mural thickening may be related to underdistention, there is perivesicular haziness concerning for cystitis. REPRODUCTIVE ORGANS: Hysterectomy. BOWEL: No bowel obstruction. Appendix is normal. Uncomplicated sigmoid diverticula. Rectocele. PERITONEUM/RETROPERITONEUM: Right paracolic haziness and stranding. Cannot exclude an infiltrative process involving the peritoneum. - a/p oral contrast (10/12/17): There is bilateral hydronephrosis, moderate on the right and moderate to severe on the left. The degree of hydronephrosis appears increased from prior evaluation and the configuration of hydronephrosis suggests underlying UPJ obstructions. Correlate clinically. There is a 3 mm calculus identified within the right renal pelvis. No left-sided renal calculi are noted. However, there is asymmetric infiltration of the left perirenal fat and thickening of adjacent intraperitoneal fascial planes. 2 small calcifications in each measuring less than 3 mm are identified layering posteriorly within the distended urinary bladder, likely related to passed calculi; left-sided pyelonephritis cannot be excluded.  PMHx: HTN, HLD, GERD, DM 2 SxHx: Hysterectomy (2003), breast surgery FHx: Ovarian cancer (mother), CAD (brother) SocHx: Current smoker (half pack per day x 50 years) Allergies: NKDA, Lipitor gave her myalgias  The patient denies fevers, chills, nausea and or vomiting and no unexplained weight loss. All pertinent laboratory, films and physician notes were reviewed.   Patient is currently experiencing no symptoms, urinary urgency and urinary frequency.

## 2025-05-28 NOTE — ASSESSMENT
[FreeTextEntry1] : 74F with longstanding bilateral hydronephrosis, recently noted worsening of left side. Patient has a 1.3 cm stone noted on the right side. Renal scan shows no interval change in the last 10 years though persistent obstruction noted. Negative hematuria workup completed 10/9/2024. Noted to have significant cystocele on exam with medialization of her UOs. Had pessary placed with Dr. Jurado. Is s/p bilateral ureteral stent placement on 10/30/2024. Most recent renal bladder US shows improved mild/moderate bilateral hydro s/p stents.   -Hospital labs and imaging reviewed -Ultrasound and MRI images reviewed from present to 2017 -CT urogram reviewed -patient understands she needs to follow-up with her PCP regarding the lesion in the lung that was noted -Renal lasix scan reviewed -UDS performed by Dr. Jurado on 12/30/2024 revealed small bladder capacity unsure if related to bilateral stents versus OAB -UA/Ucx today -Office cystoscopy neg for malignancy 10/9/24 - significant prolapse noted w/ medialization of UOs -Plan for office stent removal (pt would like to delay for 3 weeks, risk of infection discussed).  -Plan for US 2 weeks after stent pull and renal lasix scan 1 month after pull  Rose Kwok MD Chief of Urology, [address], [facility] [address]tr[address] [place] [address] Phone: [phone] Fax: [phone].

## 2025-05-28 NOTE — PHYSICAL EXAM
[General Appearance - Well Developed] : well developed [Normal Appearance] : normal appearance [Well Groomed] : well groomed [General Appearance - In No Acute Distress] : no acute distress [] : no respiratory distress [Exaggerated Use Of Accessory Muscles For Inspiration] : no accessory muscle use [Normal Station and Gait] : the gait and station were normal for the patient's age [Skin Color & Pigmentation] : normal skin color and pigmentation [Skin Turgor] : supple [No Focal Deficits] : no focal deficits [Oriented To Time, Place, And Person] : oriented to person, place, and time [Affect] : the affect was normal [Mood] : the mood was normal [de-identified] : flat, non-distended

## 2025-05-28 NOTE — END OF VISIT
"  SUBJECTIVE:   Nicole Aguilera is a 89 year old female who presents for Preventive Visit.    Lives with her son.  She is independent with ADLs.   Are you in the first 12 months of your Medicare Part B coverage?  No    Physical Health:    In general, how would you rate your overall physical health? good    Outside of work, how many days during the week do you exercise? none    Outside of work, approximately how many minutes a day do you exercise?not applicable    If you drink alcohol do you typically have >3 drinks per day or >7 drinks per week? No    Do you usually eat at least 4 servings of fruit and vegetables a day, include whole grains & fiber and avoid regularly eating high fat or \"junk\" foods? Yes    Do you have any problems taking medications regularly?  No    Do you have any side effects from medications? none    Needs assistance for the following daily activities: outside of local travel    Which of the following safety concerns are present in your home?  none identified     Hearing impairment: No    In the past 6 months, have you been bothered by leaking of urine? yes    Mental Health:    In general, how would you rate your overall mental or emotional health? fair  PHQ-9 SCORE 2019   PHQ-9 Total Score 5     Do you feel safe in your environment? Yes    Do you have a Health Care Directive? No: Advance care planning was reviewed with patient; patient declined at this time.    Additional concerns to address?  YES    Fall risk:  Fallen 2 or more times in the past year?: No  Any fall with injury in the past year?: No    Cognitive Screenin) Repeat 3 items (Leader, Season, Table)    2) Clock draw: NORMAL  3) 3 item recall: Recalls 2 objects   Results: NORMAL clock, 1-2 items recalled: COGNITIVE IMPAIRMENT LESS LIKELY    Mini-CogTM Copyright JENNIFER Negrete. Licensed by the author for use in [facility]; reprinted with permission (chaya@.[facility]). All rights reserved.      Do you have sleep apnea, " [Time Spent: ___ minutes] : I have spent [unfilled] minutes of time on the encounter which excludes teaching and separately reported services. excessive snoring or daytime drowsiness?: yes      Diarrhea  On going - 3-4 times per day, loose. No blood. No nausea or vomiting, no abdominal pain. Thought maybe due to metformin, went down on the dose but that didn't seem to help so went back up. Hasn't noticed a pattern with food.  Does eat a lot of fruit.       Reviewed and updated as needed this visit by clinical staff  Tobacco  Allergies  Meds  Problems  Med Hx  Surg Hx  Fam Hx  Soc Hx          Reviewed and updated as needed this visit by Provider  Tobacco  Allergies  Meds  Problems  Med Hx  Surg Hx  Fam Hx  Soc Hx         Social History     Tobacco Use     Smoking status: Never Smoker     Smokeless tobacco: Never Used   Substance Use Topics     Alcohol use: No     Alcohol/week: 0.0 oz                           Current providers sharing in care for this patient include:   Patient Care Team:  Ping Cuellar MD as PCP - General (Pediatrics)  Ping Cuellar MD as PCP - Assigned PCP    The following health maintenance items are reviewed in Epic and correct as of today:  Health Maintenance   Topic Date Due     ZOSTER IMMUNIZATION (2 of 3) 11/04/2016     DEXA Q2 YR  03/12/2017     ADVANCE DIRECTIVE PLANNING Q5 YRS  12/06/2017     MARCE QUESTIONNAIRE 1 YEAR  05/11/2018     PHQ-9 Q1YR  05/11/2018     TSH W/ FREE T4 REFLEX Q2 YEAR  05/19/2018     FOOT EXAM Q1 YEAR  05/23/2018     EYE EXAM Q1 YEAR  06/19/2018     LIPID MONITORING Q1 YEAR  08/22/2018     INFLUENZA VACCINE (1) 09/01/2018     MICROALBUMIN Q1 YEAR  11/02/2018     A1C Q6 MO  01/23/2019     FALL RISK ASSESSMENT  05/21/2019     CREATININE Q1 YEAR  07/23/2019     DTAP/TDAP/TD IMMUNIZATION (2 - Td) 01/16/2022     IPV IMMUNIZATION  Aged Out     MENINGITIS IMMUNIZATION  Aged Out     Patient Active Problem List   Diagnosis     History of malignant neoplasm of large intestine     Advanced directives, counseling/discussion     DJD (degenerative joint disease), lumbar     Stasis eczema      Hypertension goal BP (blood pressure) < 140/90     Hyperlipidemia LDL goal <100     Incontinence of urine     Obesity, Class I, BMI 30-34.9     Type 2 diabetes mellitus without complications (H)     Lichen sclerosus et atrophicus of the vulva     Type 2 diabetes mellitus with diabetic neuropathy (H)     Chronic left-sided low back pain with left-sided sciatica     Past Surgical History:   Procedure Laterality Date     ABLATE VEIN VARICOSE RADIO FREQUENCY WITHOUT PHLEBECTOMY MULTIPLE STAB  2013     CATARACT IOL, RT/LT  2013     COLON SURGERY  2004    colon cancer     COLONOSCOPY  2013    adenomatous polyp     HC OPEN TX RADIAL HEAD/NECK FRACTURE      radial head fracture repair     HYSTERECTOMY TOTAL ABDOMINAL  1988    uterine prolapse       Social History     Tobacco Use     Smoking status: Never Smoker     Smokeless tobacco: Never Used   Substance Use Topics     Alcohol use: No     Alcohol/week: 0.0 oz     Family History   Problem Relation Age of Onset     Hypertension Mother      Cerebrovascular Disease Mother      Prostate Cancer Father      Cancer - colorectal Father      Hypertension Sister      Diabetes Maternal Grandmother          Current Outpatient Medications   Medication Sig Dispense Refill     amLODIPine (NORVASC) 5 MG tablet TAKE 1 TABLET BY MOUTH  DAILY 90 tablet 3     aspirin 81 MG chewable tablet Take 1 tablet (81 mg) by mouth daily 108 tablet 3     glimepiride (AMARYL) 4 MG tablet TAKE 1 TABLET BY MOUTH  DAILY 90 tablet 0     metFORMIN (GLUCOPHAGE-XR) 500 MG 24 hr tablet TAKE 2 TABLETS BY MOUTH TWO TIMES DAILY 360 tablet 0     metoprolol succinate (TOPROL-XL) 50 MG 24 hr tablet TAKE 1 TABLET BY MOUTH  DAILY 90 tablet 3     ONETOUCH ULTRA test strip USE AS DIRECTED TO TEST FOUR TIMES DAILY 400 strip 0     ramipril (ALTACE) 5 MG capsule TAKE 3 CAPSULES BY MOUTH  DAILY 270 capsule 0         ROS:  Constitutional, HEENT, cardiovascular, pulmonary, GI, , musculoskeletal, neuro, skin, endocrine and psych  "systems are negative, except as otherwise noted.    OBJECTIVE:   /70   Pulse 82   Temp 96.8  F (36  C)   Ht 1.626 m (5' 4\")   Wt 91.6 kg (202 lb)   BMI 34.67 kg/m   Estimated body mass index is 34.67 kg/m  as calculated from the following:    Height as of this encounter: 1.626 m (5' 4\").    Weight as of this encounter: 91.6 kg (202 lb).    EXAM:   GENERAL APPEARANCE: healthy, alert and no distress  EYES: Eyes grossly normal to inspection, PERRL and conjunctivae and sclerae normal  HENT: ear canals and TM's normal, mouth without ulcers or lesions, oropharynx clear and oral mucous membranes moist  NECK: no adenopathy, no asymmetry, masses, or scars and thyroid normal to palpation  RESP: lungs clear to auscultation - no rales, rhonchi or wheezes  CV: regular rate and rhythm, normal S1 S2, no S3 or S4, no murmur, click or rub, no peripheral edema and peripheral pulses strong  ABDOMEN: soft, nontender, no masses and bowel sounds normal  MS: no musculoskeletal defects are noted and gait is age appropriate without ataxia  NEURO: Normal strength and tone, sensory exam grossly normal, mentation intact and speech normal. Decreased sensation to monofilament b/l.   PSYCH: mentation appears normal and affect normal/bright    Results for orders placed or performed in visit on 01/04/19   HEMOGLOBIN A1C   Result Value Ref Range    Hemoglobin A1C 8.1 (H) 0 - 5.6 %         ASSESSMENT / PLAN:   1. Routine adult health maintenance  FIT test for colon cancer screening done 10/2018  Discussed mammograms, okay to continue if she'd like but certainly reasonable to stop   imms UTD     2. Type 2 diabetes mellitus with diabetic polyneuropathy, without long-term current use of insulin (H)  a1c is up to 8.1% today. Will increase glimepiride from 4 to 6mg.  Follow up 4 months.   - HEMOGLOBIN A1C  - Lipid panel reflex to direct LDL Fasting  - Albumin Random Urine Quantitative with Creat Ratio  - TSH WITH FREE T4 REFLEX  - metFORMIN " "(GLUCOPHAGE-XR) 500 MG 24 hr tablet; TAKE 2 TABLETS BY MOUTH TWO TIMES DAILY  Dispense: 360 tablet; Refill: 1  - glimepiride (AMARYL) 2 MG tablet; Take 3 tablets (6 mg) by mouth every morning (before breakfast)  Dispense: 270 tablet; Refill: 1    3. Screening for diabetic peripheral neuropathy  - FOOT EXAM  NO CHARGE [54198.114]    4. Need for vaccination  - SHINGRIX [57795]  - 1st  Administration  [36853]    5. Hypertension goal BP (blood pressure) < 140/90  Well controlled   - ramipril (ALTACE) 5 MG capsule; TAKE 3 CAPSULES BY MOUTH  DAILY  Dispense: 270 capsule; Refill: 1  - metoprolol succinate ER (TOPROL-XL) 50 MG 24 hr tablet; Take 1 tablet (50 mg) by mouth daily  Dispense: 90 tablet; Refill: 3  - amLODIPine (NORVASC) 5 MG tablet; Take 1 tablet (5 mg) by mouth daily  Dispense: 90 tablet; Refill: 3    6. Diarrhea  Recommended she try cutting down on fruit and eating more vegetables and fiber, try probiotic.  Let us know if symptoms continue.       End of Life Planning:  Patient currently has an advanced directive: No.  I have verified the patient's ablity to prepare an advanced directive/make health care decisions.      COUNSELING:  Reviewed preventive health counseling, as reflected in patient instructions  Special attention given to:       Regular exercise       Healthy diet/nutrition       Dental care       preventing falls    BP Readings from Last 1 Encounters:   01/04/19 120/70     Estimated body mass index is 34.67 kg/m  as calculated from the following:    Height as of this encounter: 1.626 m (5' 4\").    Weight as of this encounter: 91.6 kg (202 lb).      Weight management plan: Discussed healthy diet and exercise guidelines     reports that  has never smoked. she has never used smokeless tobacco.      Appropriate preventive services were discussed with this patient, including applicable screening as appropriate for cardiovascular disease, diabetes, osteopenia/osteoporosis, and glaucoma.  As appropriate " for age/gender, discussed screening for colorectal cancer, prostate cancer, breast cancer, and cervical cancer. Checklist reviewing preventive services available has been given to the patient.    Reviewed patients plan of care and provided an AVS. The Basic Care Plan (routine screening as documented in Health Maintenance) for Nicole meets the Care Plan requirement. This Care Plan has been established and reviewed with the Patient.    Counseling Resources:  ATP IV Guidelines  Pooled Cohorts Equation Calculator  Breast Cancer Risk Calculator  FRAX Risk Assessment  ICSI Preventive Guidelines  Dietary Guidelines for Americans, 2010  USDA's MyPlate  ASA Prophylaxis  Lung CA Screening    Pilar Stein MD  [facility] [place]

## 2025-06-23 NOTE — HISTORY OF PRESENT ILLNESS
[FreeTextEntry1] : Referring Provider: Dr. Park Esquivel Chief Complaint: Bilateral hydronephrosis and nephrolithiasis Date of first visit: 09/09/2024  DINAH SAHU is a 74 year old Thai woman with a PMHx of HTN, HLD, DM2 who presents for evaluation of bilateral hydronephrosis. Patient states that she is only done this fairly recently, but review of her imaging in PACS show that she has had bilateral hydronephrosis since as early as 2017. At that point in time was greater on the right side than the left, though most recent imaging showed that it is no greater on the left than right. Patient has been completely asymptomatic. Has no history of passing a stone though 1.3 cm stone has been seen on the right side this increase in size since previously noted. Patient reports an episode of gross hematuria in 2012 that she believes might have been associated with an infection but is unclear. She also has been seen to have significant amounts of microscopic hematuria but denies any formal hematuria workup. Patient is completely asymptomatic and denies any history of flank pain. Denies recurrent UTIs.  Negative hematuria workup as of 10/9/2024. On examination, patient noted to have cystocele that is causing medialization of ureteral orifice. This may be the cause of her bilateral hydronephrosis due to ureteral kinking. Renal scan reviewed. Evidence of obstruction though stable.  As of 10/28/24, patient presents today s/p ER evaluation on 10/14/24 for right renal colic. CT showed right hemorrhage within collecting system, possible due to intrarenal stone. Hx of bilateral hydronephrosis and cystocele, underwent in office cysto on 10/9/24. Discharged home with close follow up. UA/Ucx ordered on 10/24/24 after patient called office for complaints of UTI symptoms with no reported gross hematuria. +Ucx positive, abx sent. Today, she presents to the office pale and ill-appearing with reports of gross hematuria, currently describing urine as a burgundy color that began 10/26/24, accompanied by dizziness, weakness and left flank pain radiating to LLQ. She reports one episode of vomiting on Saturday. Denies fever, although has not checked her temperature. She denies chest pain or sob.  As of 1/13/2025, patient presents today for follow-up. Patient is status post bilateral ureteral stent placement on 10/30/2024. History of unexplained bilateral hydronephrosis and UPJ obstruction. She was seen by Dr. Jurado on 12/30/2024 and underwent UDS. Patient was noted to small bladder capacity, unsure if related to bilateral stents vs OAB. She was sent home with Gemtesa for OAB symptoms, however patient reports medication is too expensive and has not continued treatment. 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 Patient was also advised to undergo renal ultrasound, pending imaging date. She presents today with complaints of suprapubic pain, as well as urinary urgency and frequency. Reports occasional nausea, which patient endorses is triggered by stent pain. She denies fever or gross hematuria. Denies leaking or sensation of incomplete bladder emptying.  3/10/25: Patient presents today with complaints of burning with urination. Describes intermittent episodes of burning sensation to vulvar region as she urinates. Has tried several OTC products, such as Desitin, Aquaphor and vaseline for the discomfort with no relief. She continues to use diapers daily due to occasional urge incontinence. Denies issues with pessary placement. She denies flank pain or recent UTI symptoms.  5/28/35: rash not resolved. Pt still reporting urge incontinence. No issue w/ pessary. Renal US shows improvement in hydro.  6/23/25: Presents for cystoscopy and stent removal today. Has been taking PO abx as prescribed. Still endorses urinary frequency and vaginal burning w/ and w/o urination. Believes pessary is causing discomfort. UA was (+) for nitrites and pt symptomatic. Cysto was deferred.   Ucx Hx 12/30/2024 Negative 10/25/24: Prelim <100,000 Gram Negative Rods  Renal Lasix Scan (10/02/24): -Following diuretic challenge, the T-1/2 washout from the left collecting system is 32 minutes; the T1/2 washout from the right collecting system is 16 minutes (normal:10 minutes or less). -Differential renal function: Right kidney: 48% (previously: 46%) ; Left kidney: 52% (previously: 54%). -Normal flow to and function of the LEFT kidney with evidence of obstruction. Allowing for differences in technique there has been no significant interval change in function or in response to diuretic challenge compared to the previous study of 12/28/2011. -Normal flow to and function of the RIGHT kidney which demonstrates an equivocal response to diuretic challenge. Partial or low-grade obstruction cannot be excluded. Allowing for differences in technique there has been no significant interval change in function or in response to diuretic challenge compared to the previous study of 12/28/2011.  US Hx: - renal/bladder (2/28/25): Right kidney: 11.5 cm. Mild/moderate hydronephrosis. 1.0 x 0.6 x 1.1 cm midpole shadowing calculus. Ureteral stent noted. Left kidney: 10.9 cm. Mild/moderate hydronephrosis. Ureteral stent noted. Urinary bladder: Bladder volume of 63 mL  - renal/bladder (8/16/2024): Right kidney: 12.2 cm. A 1.3 cm obstructing calculus is seen in the renal pelvis producing moderate right hydronephrosis. This is more pronounced when compared to prior study. Left kidney: 11.6 cm. Severe left hydronephrosis is appreciated. No discrete renal calculus is seen. Distal obstruction cannot be excluded. Urinary bladder: The bladder is moderately distended. No discrete bladder calculus is seen. No significant focal bladder wall thickening is seen. A prevoid bladder volume of 234 cc is noted. A post void bladder volume of 80 cc is noted.  - renal/dwkggkz70/22/20): Right kidney: 11.3 cm. A 1.1 cm calculus is seen in the right renal pelvis. Mild right hydronephrosis is seen. This is without significant change from prior study. Left kidney: 11.3 cm. Mild to moderate left hydronephrosis is seen. Distal obstruction cannot be excluded. This is also without significant change from prior study. Urinary bladder: The bladder is mildly distended. No focal bladder wall thickening is seen. No discrete bladder calculus is noted. A prevoid bladder volume of 158 cc is noted. A post void bladder volume of 66 cc is noted.  -Renal/bladder (12/18/17): Right kidney: 10.5 cm. Mild to moderate right hydronephrosis is seen. 6 mm calculus is seen in the right renal pelvis. Left kidney: 10.4 cm. Moderate left hydronephrosis is seen. No discrete renal calculus is seen. Distal obstruction cannot be excluded. Urinary bladder: The bladder was empty limiting evaluation.  MRI Hx: -abd (3/17/2021): Stable bilateral hydronephrosis, mild to moderate on the right and moderate on the left. There is a stable small nonobstructive calculus in the dependent right renal pelvis.  CT Hx: -a/p contrast (10/14/24): KIDNEYS/URETERS: Moderately severe right and moderate left-sided dilatation of the renal pelvis and fullness of the calyces, essentially unchanged. There has been interval development of hyperdense material within the right renal infundibula and renal pelvis suggestive of hemorrhagic content. There is an 8 mm sized nonobstructing calculus within the dilated right renal pelvis and a 3 mm sized nonobstructing calculus within the right lower pole renal calyx. Mural enhancement of the right renal pelvis and proximal right ureter is noted suggesting pyelitis and ureteritis. BLADDER: Under distended. Although mural thickening may be related to underdistention, there is perivesicular haziness concerning for cystitis. REPRODUCTIVE ORGANS: Hysterectomy. BOWEL: No bowel obstruction. Appendix is normal. Uncomplicated sigmoid diverticula. Rectocele. PERITONEUM/RETROPERITONEUM: Right paracolic haziness and stranding. Cannot exclude an infiltrative process involving the peritoneum. - a/p oral contrast (10/12/17): There is bilateral hydronephrosis, moderate on the right and moderate to severe on the left. The degree of hydronephrosis appears increased from prior evaluation and the configuration of hydronephrosis suggests underlying UPJ obstructions. Correlate clinically. There is a 3 mm calculus identified within the right renal pelvis. No left-sided renal calculi are noted. However, there is asymmetric infiltration of the left perirenal fat and thickening of adjacent intraperitoneal fascial planes. 2 small calcifications in each measuring less than 3 mm are identified layering posteriorly within the distended urinary bladder, likely related to passed calculi; left-sided pyelonephritis cannot be excluded.  PMHx: HTN, HLD, GERD, DM 2 SxHx: Hysterectomy (2003), breast surgery FHx: Ovarian cancer (mother), CAD (brother) SocHx: Current smoker (half pack per day x 50 years) Allergies: NKDA, Lipitor gave her myalgias  The patient denies fevers, chills, nausea and or vomiting and no unexplained weight loss. All pertinent laboratory, films and physician notes were reviewed.   Patient is currently experiencing no symptoms, urinary urgency and urinary frequency.

## 2025-06-23 NOTE — ASSESSMENT
[FreeTextEntry1] : 74F with longstanding bilateral hydronephrosis, recently noted worsening of left side. Patient has a 1.3 cm stone noted on the right side. Renal scan shows no interval change in the last 10 years though persistent obstruction noted. Negative hematuria workup completed 10/9/2024. Noted to have significant cystocele on exam with medialization of her UOs. Had pessary placed with Dr. Jurado. Is s/p bilateral ureteral stent placement on 10/30/2024. Plan is to performed in-office cysto stent removal.   UA dip today in office +nitrites.   -Hospital labs and imaging reviewed -UA dip today in office +nitrites. Will obtain Ucx  -Ultrasound and MRI images reviewed from present to 2017 -CT urogram reviewed -patient understands she needs to follow-up with her PCP regarding the lesion in the lung that was noted -Renal lasix scan reviewed -UDS performed by Dr. Jurado on 12/30/2024 revealed small bladder capacity unsure if related to bilateral stents versus OAB -Office cystoscopy neg for malignancy 10/9/24 - significant prolapse noted w/ medialization of UOs -Pessary maintenance performed today in office  -Plan for office stent removal on 6/25 after 3 doses of IM ertapenem, first dose given today -RTC tomorrow 6/24 for IM ertapenem dose #2  Rose Kwok MD Chief of Urology, [address], [address] [place] [address] Phone: [phone] Fax: [phone]

## 2025-06-23 NOTE — PHYSICAL EXAM
[Normal Appearance] : normal appearance [Well Groomed] : well groomed [General Appearance - In No Acute Distress] : no acute distress [Edema] : no peripheral edema [Respiration, Rhythm And Depth] : normal respiratory rhythm and effort [Exaggerated Use Of Accessory Muscles For Inspiration] : no accessory muscle use [Abdomen Soft] : soft [Abdomen Tenderness] : non-tender [Costovertebral Angle Tenderness] : no ~M costovertebral angle tenderness [Urinary Bladder Findings] : the bladder was normal on palpation [Normal Station and Gait] : the gait and station were normal for the patient's age [] : no rash [No Focal Deficits] : no focal deficits [Oriented To Time, Place, And Person] : oriented to person, place, and time [Affect] : the affect was normal [Mood] : the mood was normal [General Appearance - Well Developed] : well developed [General Appearance - Well Nourished] : well nourished [Urethral Meatus] : the meatus of the urethra showed no abnormalities [Skin Color & Pigmentation] : normal skin color and pigmentation [Not Anxious] : not anxious [de-identified] : Pessary maintenance performed. No ulcers/lesions noted on exam. No vulvar erythema/rash noted.  [FreeTextEntry2] : Adia Ahn NP

## 2025-06-24 NOTE — HISTORY OF PRESENT ILLNESS
[FreeTextEntry1] : 74F with PMHx of HTN, HLD, DM 2, and bilateral hydronephrosis presents today for therapeutic injection #2/3 of Ertapenem.  She is overall feeling well and denies any side effects post first injection yesterday. Is scheduled to have upcoming in-office cystoscopy ureteral stent removal tomorrow with Dr. Kwok.

## 2025-06-24 NOTE — PHYSICAL EXAM
[Normal Appearance] : normal appearance [Well Groomed] : well groomed [General Appearance - In No Acute Distress] : no acute distress [Edema] : no peripheral edema [Respiration, Rhythm And Depth] : normal respiratory rhythm and effort [Exaggerated Use Of Accessory Muscles For Inspiration] : no accessory muscle use [Urinary Bladder Findings] : the bladder was normal on palpation [Normal Station and Gait] : the gait and station were normal for the patient's age [] : no rash [No Focal Deficits] : no focal deficits [Oriented To Time, Place, And Person] : oriented to person, place, and time [Affect] : the affect was normal [Mood] : the mood was normal [de-identified] : Nondistended

## 2025-06-24 NOTE — ASSESSMENT
[FreeTextEntry1] : 74F with history of UTI here for Ertapenem injection #2/3 administered by LEBRON Robles. Tolerated well.  -RTC 6/25 for injection #3 and possible in-office cystoscopy stent pull with Dr. Kwok

## 2025-07-28 NOTE — CONSULT LETTER
[Dear  ___] : Dear  [unfilled], [Consult Letter:] : I had the pleasure of evaluating your patient, [unfilled]. [Please see my note below.] : Please see my note below. [Consult Closing:] : Thank you very much for allowing me to participate in the care of this patient.  If you have any questions, please do not hesitate to contact me. [Sincerely,] : Sincerely, [FreeTextEntry2] : Dr. Park Esquivel (PCP) [FreeTextEntry3] : Prieto Hillman MD  Attending Surgeon  Division of Thoracic Surgery  , Cardiovascular and Thoracic Surgery  [facility]

## 2025-07-28 NOTE — CONSULT LETTER
[Dear  ___] : Dear  [unfilled], [Consult Letter:] : I had the pleasure of evaluating your patient, [unfilled]. [Please see my note below.] : Please see my note below. [Consult Closing:] : Thank you very much for allowing me to participate in the care of this patient.  If you have any questions, please do not hesitate to contact me. [Sincerely,] : Sincerely, [FreeTextEntry2] : Dr. Park Esquivel (PCP) [FreeTextEntry3] : Prieto Hillman MD  Attending Surgeon  Division of Thoracic Surgery  , Cardiovascular and Thoracic Surgery  [facility] at [facility]

## 2025-07-28 NOTE — ASSESSMENT
[FreeTextEntry1] : Ms. 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 Who c/o ~ 20 lbs weight loss over 9 months period, sent for CT C/A/P, found to have enlarged RLL nodule. Referred here by Dr. Park Esquivel (PCP) for evaluation.   CT of C/A/P- 6/05/2025 - There is emphysema. There is a 4 mm left upper lobe nkechi-fissural nodule without significant change CT chest 1/6/2014.  - There is a 10 mm nodule within the right lower lobe increased in size since CT chest from 1/6/2014 but without significant change when compared to CT abdomen 10/28/2014. - - There are additional scattered bilateral pulmonary nodules measuring up to a 4 mm right upper lobe peribronchial nodule, 3-49 which are new. - There are coronary artery calcifications - Right mild and left moderate hydronephrosis. Bilateral nephro-ureteral catheters. Right renal 5 mm Non obstructing stone.  I have reviewed the patient's medical records and diagnostic images at time of this office consultation and have made the following recommendation: 1. CT scan reviewed and explained to patient, enlarged RLL nodule, appears benign given the smooth boarder, however, metastatic disease cannot be excluded.  We discussed to obtain a PET/CT for further evaluation.  2. F/u in 2 weeks with same day PFTs at [facility].   I, MARIA DOLORES Byers, personally performed the evaluation and management (E/M) services for this new patient.  That E/M includes conducting the initial examination, assessing all conditions, and establishing the plan of care.  Today, my ACP, DORIS NealC, was here to observe my evaluation and management services for this patient to be followed going forward.

## 2025-07-28 NOTE — HISTORY OF PRESENT ILLNESS
details… [FreeTextEntry1] : Ms. 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 Who c/o ~ 20 lbs weight loss over 9 months period, sent for CT C/A/P, found to have enlarged RLL nodule. Referred here by Dr. Park Esquivel (PCP) for evaluation.   CT of C/A/P- 6/05/2025 - There is emphysema. There is a 4 mm left upper lobe nkechi-fissural nodule without significant change CT chest 1/6/2014.  - There is a 10 mm nodule within the right lower lobe increased in size since CT chest from 1/6/2014 but without significant change when compared to CT abdomen 10/28/2014. - - There are additional scattered bilateral pulmonary nodules measuring up to a 4 mm right upper lobe peribronchial nodule, 3-49 which are new. - There are coronary artery calcifications - Right mild and left moderate hydronephrosis. Bilateral nephro-ureteral catheters. Right renal 5 mm Non obstructing stone.  Patient is here today for SX consultation. Patient denies shortness of breath, cough, chest pain, fever, chills, loss of appetite or hemoptysis.   right knee/no calf tenderness/decreased ROM due to pain

## 2025-07-28 NOTE — ASSESSMENT
[FreeTextEntry1] : Ms. 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 Who c/o ~ 20 lbs weight loss over 9 months period, sent for CT C/A/P, found to have enlarged RLL nodule. Referred here by Dr. Park Esquivel (PCP) for evaluation.   CT of C/A/P- 6/05/2025 - There is emphysema. There is a 4 mm left upper lobe nkechi-fissural nodule without significant change CT chest 1/6/2014.  - There is a 10 mm nodule within the right lower lobe increased in size since CT chest from 1/6/2014 but without significant change when compared to CT abdomen 10/28/2014. - - There are additional scattered bilateral pulmonary nodules measuring up to a 4 mm right upper lobe peribronchial nodule, 3-49 which are new. - There are coronary artery calcifications - Right mild and left moderate hydronephrosis. Bilateral nephro-ureteral catheters. Right renal 5 mm Non obstructing stone.  I have reviewed the patient's medical records and diagnostic images at time of this office consultation and have made the following recommendation: 1. CT scan reviewed and explained to patient, enlarged RLL nodule, appears benign given the smooth boarder, however, metastatic disease cannot be excluded.  We discussed to obtain a PET/CT for further evaluation.  2. F/u in 2 weeks with same day PFTs at [facility].   I, MARIA DOLORES Byers, personally performed the evaluation and management (E/M) services for this new patient.  That E/M includes conducting the initial examination, assessing all conditions, and establishing the plan of care.  Today, my ACP, DORIS NealC, was here to observe my evaluation and management services for this patient to be followed going forward.  03-May-2025 05:39

## 2025-07-28 NOTE — HISTORY OF PRESENT ILLNESS
[FreeTextEntry1] : Ms. 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 Who c/o ~ 20 lbs weight loss over 9 months period, sent for CT C/A/P, found to have enlarged RLL nodule. Referred here by Dr. Park Esquivel (PCP) for evaluation.   CT of C/A/P- 6/05/2025 - There is emphysema. There is a 4 mm left upper lobe nkechi-fissural nodule without significant change CT chest 1/6/2014.  - There is a 10 mm nodule within the right lower lobe increased in size since CT chest from 1/6/2014 but without significant change when compared to CT abdomen 10/28/2014. - - There are additional scattered bilateral pulmonary nodules measuring up to a 4 mm right upper lobe peribronchial nodule, 3-49 which are new. - There are coronary artery calcifications - Right mild and left moderate hydronephrosis. Bilateral nephro-ureteral catheters. Right renal 5 mm Non obstructing stone.  Patient is here today for SX consultation. Patient denies shortness of breath, cough, chest pain, fever, chills, loss of appetite or hemoptysis.